# Patient Record
Sex: MALE | Employment: OTHER | URBAN - METROPOLITAN AREA
[De-identification: names, ages, dates, MRNs, and addresses within clinical notes are randomized per-mention and may not be internally consistent; named-entity substitution may affect disease eponyms.]

---

## 2024-01-21 ENCOUNTER — HOSPITAL ENCOUNTER (EMERGENCY)
Age: 24
Discharge: HOME | End: 2024-01-21
Attending: EMERGENCY MEDICINE
Payer: COMMERCIAL

## 2024-01-21 ENCOUNTER — APPOINTMENT (OUTPATIENT)
Dept: GENERAL RADIOLOGY | Age: 24
End: 2024-01-21
Payer: COMMERCIAL

## 2024-01-21 ENCOUNTER — APPOINTMENT (OUTPATIENT)
Dept: CT IMAGING | Age: 24
End: 2024-01-21
Payer: COMMERCIAL

## 2024-01-21 VITALS
SYSTOLIC BLOOD PRESSURE: 133 MMHG | DIASTOLIC BLOOD PRESSURE: 81 MMHG | WEIGHT: 180 LBS | OXYGEN SATURATION: 100 % | TEMPERATURE: 99.7 F | RESPIRATION RATE: 17 BRPM | HEART RATE: 95 BPM

## 2024-01-21 DIAGNOSIS — R55 SYNCOPE AND COLLAPSE: Primary | ICD-10-CM

## 2024-01-21 DIAGNOSIS — D64.9 ANEMIA, UNSPECIFIED TYPE: ICD-10-CM

## 2024-01-21 LAB
ALBUMIN SERPL-MCNC: 3.9 G/DL (ref 3.5–5.2)
ALP SERPL-CCNC: 48 U/L (ref 40–129)
ALT SERPL-CCNC: 31 U/L (ref 5–41)
ANION GAP SERPL CALCULATED.3IONS-SCNC: 11 MMOL/L (ref 9–17)
AST SERPL-CCNC: 16 U/L
BASOPHILS # BLD: 0.12 K/UL (ref 0–0.2)
BASOPHILS NFR BLD: 1 % (ref 0–2)
BILIRUB DIRECT SERPL-MCNC: <0.1 MG/DL
BILIRUB INDIRECT SERPL-MCNC: ABNORMAL MG/DL (ref 0–1)
BILIRUB SERPL-MCNC: 0.2 MG/DL (ref 0.3–1.2)
BUN SERPL-MCNC: 30 MG/DL (ref 6–20)
BUN/CREAT SERPL: 43 (ref 9–20)
CALCIUM SERPL-MCNC: 8.2 MG/DL (ref 8.6–10.4)
CHLORIDE SERPL-SCNC: 108 MMOL/L (ref 98–107)
CO2 SERPL-SCNC: 21 MMOL/L (ref 20–31)
CREAT SERPL-MCNC: 0.7 MG/DL (ref 0.7–1.2)
EOSINOPHIL # BLD: 0.3 K/UL (ref 0–0.44)
EOSINOPHILS RELATIVE PERCENT: 3 % (ref 1–4)
ERYTHROCYTE [DISTWIDTH] IN BLOOD BY AUTOMATED COUNT: 13.5 % (ref 11.8–14.4)
GFR SERPL CREATININE-BSD FRML MDRD: >60 ML/MIN/1.73M2
GLUCOSE SERPL-MCNC: 117 MG/DL (ref 70–99)
HCT VFR BLD AUTO: 26.5 % (ref 40.7–50.3)
HGB BLD-MCNC: 9.1 G/DL (ref 13–17)
IMM GRANULOCYTES # BLD AUTO: 0.36 K/UL (ref 0–0.3)
IMM GRANULOCYTES NFR BLD: 3 %
LYMPHOCYTES NFR BLD: 2.85 K/UL (ref 1.1–3.7)
LYMPHOCYTES RELATIVE PERCENT: 24 % (ref 24–43)
MAGNESIUM SERPL-MCNC: 1.7 MG/DL (ref 1.6–2.6)
MCH RBC QN AUTO: 28.7 PG (ref 25.2–33.5)
MCHC RBC AUTO-ENTMCNC: 34.3 G/DL (ref 28.4–34.8)
MCV RBC AUTO: 83.6 FL (ref 82.6–102.9)
MONOCYTES NFR BLD: 0.62 K/UL (ref 0.1–1.2)
MONOCYTES NFR BLD: 5 % (ref 3–12)
NEUTROPHILS NFR BLD: 64 % (ref 36–65)
NEUTS SEG NFR BLD: 7.83 K/UL (ref 1.5–8.1)
NRBC BLD-RTO: 0 PER 100 WBC
PLATELET # BLD AUTO: 252 K/UL (ref 138–453)
PMV BLD AUTO: 10.6 FL (ref 8.1–13.5)
POTASSIUM SERPL-SCNC: 4 MMOL/L (ref 3.7–5.3)
PROT SERPL-MCNC: 6 G/DL (ref 6.4–8.3)
RBC # BLD AUTO: 3.17 M/UL (ref 4.21–5.77)
SODIUM SERPL-SCNC: 140 MMOL/L (ref 135–144)
WBC OTHER # BLD: 12.1 K/UL (ref 3.5–11.3)

## 2024-01-21 PROCEDURE — 71045 X-RAY EXAM CHEST 1 VIEW: CPT

## 2024-01-21 PROCEDURE — 72125 CT NECK SPINE W/O DYE: CPT

## 2024-01-21 PROCEDURE — 93005 ELECTROCARDIOGRAM TRACING: CPT | Performed by: EMERGENCY MEDICINE

## 2024-01-21 PROCEDURE — 96360 HYDRATION IV INFUSION INIT: CPT

## 2024-01-21 PROCEDURE — 80076 HEPATIC FUNCTION PANEL: CPT

## 2024-01-21 PROCEDURE — 96361 HYDRATE IV INFUSION ADD-ON: CPT

## 2024-01-21 PROCEDURE — 99285 EMERGENCY DEPT VISIT HI MDM: CPT

## 2024-01-21 PROCEDURE — 80048 BASIC METABOLIC PNL TOTAL CA: CPT

## 2024-01-21 PROCEDURE — 85025 COMPLETE CBC W/AUTO DIFF WBC: CPT

## 2024-01-21 PROCEDURE — 83735 ASSAY OF MAGNESIUM: CPT

## 2024-01-21 PROCEDURE — 2580000003 HC RX 258: Performed by: NURSE PRACTITIONER

## 2024-01-21 PROCEDURE — 70450 CT HEAD/BRAIN W/O DYE: CPT

## 2024-01-21 RX ORDER — 0.9 % SODIUM CHLORIDE 0.9 %
1000 INTRAVENOUS SOLUTION INTRAVENOUS ONCE
Status: COMPLETED | OUTPATIENT
Start: 2024-01-21 | End: 2024-01-21

## 2024-01-21 RX ORDER — PNV NO.95/FERROUS FUM/FOLIC AC 28MG-0.8MG
1 TABLET ORAL DAILY
Qty: 14 TABLET | Refills: 0 | Status: SHIPPED | OUTPATIENT
Start: 2024-01-21

## 2024-01-21 RX ADMIN — SODIUM CHLORIDE 1000 ML: 9 INJECTION, SOLUTION INTRAVENOUS at 19:35

## 2024-01-21 RX ADMIN — SODIUM CHLORIDE 1000 ML: 9 INJECTION, SOLUTION INTRAVENOUS at 17:34

## 2024-01-21 ASSESSMENT — PAIN - FUNCTIONAL ASSESSMENT: PAIN_FUNCTIONAL_ASSESSMENT: 0-10

## 2024-01-21 ASSESSMENT — PAIN SCALES - GENERAL: PAINLEVEL_OUTOF10: 6

## 2024-01-21 NOTE — ED NOTES
Pt presents to ED via EMS. Pt had 2 witnessed syncopal episodes in the last 24 hours. Mother stated last night pt fell in bathtub. Pt does not remember what happened. Mother stated pt did his his head and lost consciousness. Pt is not on any blood thinners. Pt does not take any medications. Pt has autism and Asperger.

## 2024-01-22 LAB
EKG ATRIAL RATE: 109 BPM
EKG P AXIS: 52 DEGREES
EKG P-R INTERVAL: 130 MS
EKG Q-T INTERVAL: 324 MS
EKG QRS DURATION: 82 MS
EKG QTC CALCULATION (BAZETT): 436 MS
EKG R AXIS: 38 DEGREES
EKG T AXIS: 65 DEGREES
EKG VENTRICULAR RATE: 109 BPM

## 2024-01-22 NOTE — ED PROVIDER NOTES
eMERGENCY dEPARTMENT eNCOUnter   Independent Attestation     Pt Name: Nicholas Martinez  MRN: [id number]  Birthdate 2000  Date of evaluation: 1/21/24     Nicholas Martinez is a 23 y.o. male with CC: Loss of Consciousness (2 syncopal episodes in last 24 hours. Pt did hit his head last night. )        This visit was performed by both a physician and an APC. I performed all aspects of the MDM as documented.      Erica B Goldberger, MD  Attending Emergency Physician       Sinus tachycardia ventricular rate 109  Nonspecific T wave flattening    QTc 436  Nonspecific EKG     Goldberger, Erica B, MD  01/21/24 2038    
for exam:     Answer:   Syncope, dizziness, injury     Order Specific Question:   Decision Support Exception - unselect if not a suspected or confirmed emergency medical condition     Answer:   Emergency Medical Condition (MA) [1]    CT CERVICAL SPINE WO CONTRAST     Standing Status:   Standing     Number of Occurrences:   1     Order Specific Question:   Reason for exam:     Answer:   fall     Order Specific Question:   Decision Support Exception - unselect if not a suspected or confirmed emergency medical condition     Answer:   Emergency Medical Condition (MA) [1]    BMP     Standing Status:   Standing     Number of Occurrences:   1    CBC with Auto Differential     Standing Status:   Standing     Number of Occurrences:   1    Magnesium     Standing Status:   Standing     Number of Occurrences:   1    Hepatic Function Panel     Standing Status:   Standing     Number of Occurrences:   1    Cardiac Monitor - ED Only     Standing Status:   Standing     Number of Occurrences:   1     Order Specific Question:   Patient may go off unit without monitor     Answer:   No    Orthostatic blood pressure and pulse     Standing Status:   Standing     Number of Occurrences:   1    EKG 12 Lead     Standing Status:   Standing     Number of Occurrences:   1     Order Specific Question:   Reason for Exam?     Answer:   Altered mental status    Saline lock IV     Standing Status:   Standing     Number of Occurrences:   1         CLINICAL DECISION MAKING:  The patient presented alert with a nontoxic appearance and was seen in conjunction with Dr. Goldberger. The patient was discussed with the ED attending. Orthostatics were completed.     The patient and his mother were involved in his plan of care through shared decision making. The testing that was ordered was discussed with the patient. Any medications that may have been ordered were discussed with the patient.     I have reviewed the patient's previous medical records using the

## 2024-01-23 ENCOUNTER — HOSPITAL ENCOUNTER (INPATIENT)
Age: 24
LOS: 2 days | Discharge: HOME | End: 2024-01-25
Attending: EMERGENCY MEDICINE | Admitting: INTERNAL MEDICINE
Payer: COMMERCIAL

## 2024-01-23 ENCOUNTER — ANESTHESIA (OUTPATIENT)
Dept: OPERATING ROOM | Age: 24
End: 2024-01-23
Payer: COMMERCIAL

## 2024-01-23 ENCOUNTER — ANESTHESIA EVENT (OUTPATIENT)
Dept: OPERATING ROOM | Age: 24
End: 2024-01-23
Payer: COMMERCIAL

## 2024-01-23 DIAGNOSIS — D64.9 ANEMIA, UNSPECIFIED TYPE: ICD-10-CM

## 2024-01-23 DIAGNOSIS — K92.1 GASTROINTESTINAL HEMORRHAGE WITH MELENA: Primary | ICD-10-CM

## 2024-01-23 DIAGNOSIS — K92.2 UPPER GI BLEED: ICD-10-CM

## 2024-01-23 LAB
ALBUMIN SERPL-MCNC: 3.5 G/DL (ref 3.5–5.2)
ALP SERPL-CCNC: 40 U/L (ref 40–129)
ALT SERPL-CCNC: 19 U/L (ref 5–41)
ANION GAP SERPL CALCULATED.3IONS-SCNC: 12 MMOL/L (ref 9–17)
AST SERPL-CCNC: 15 U/L
BACTERIA URNS QL MICRO: ABNORMAL
BASOPHILS # BLD: 0.14 K/UL (ref 0–0.2)
BASOPHILS NFR BLD: 1 % (ref 0–2)
BILIRUB SERPL-MCNC: 0.1 MG/DL (ref 0.3–1.2)
BILIRUB UR QL STRIP: NEGATIVE
BUN SERPL-MCNC: 20 MG/DL (ref 6–20)
BUN/CREAT SERPL: 25 (ref 9–20)
CALCIUM SERPL-MCNC: 8 MG/DL (ref 8.6–10.4)
CHLORIDE SERPL-SCNC: 105 MMOL/L (ref 98–107)
CLARITY UR: CLEAR
CO2 SERPL-SCNC: 22 MMOL/L (ref 20–31)
COLOR UR: YELLOW
CREAT SERPL-MCNC: 0.8 MG/DL (ref 0.7–1.2)
DATE, STOOL #1: 1
EKG ATRIAL RATE: 103 BPM
EKG P AXIS: 42 DEGREES
EKG P-R INTERVAL: 124 MS
EKG Q-T INTERVAL: 360 MS
EKG QRS DURATION: 88 MS
EKG QTC CALCULATION (BAZETT): 471 MS
EKG R AXIS: 35 DEGREES
EKG T AXIS: 21 DEGREES
EKG VENTRICULAR RATE: 103 BPM
EOSINOPHIL # BLD: 0.27 K/UL (ref 0–0.44)
EOSINOPHILS RELATIVE PERCENT: 2 % (ref 1–4)
EPI CELLS #/AREA URNS HPF: ABNORMAL /HPF (ref 0–5)
ERYTHROCYTE [DISTWIDTH] IN BLOOD BY AUTOMATED COUNT: 15.7 % (ref 11.8–14.4)
GFR SERPL CREATININE-BSD FRML MDRD: >60 ML/MIN/1.73M2
GLUCOSE BLD-MCNC: 112 MG/DL (ref 75–110)
GLUCOSE SERPL-MCNC: 181 MG/DL (ref 70–99)
GLUCOSE UR STRIP-MCNC: NEGATIVE MG/DL
HCT VFR BLD AUTO: 17 % (ref 40.7–50.3)
HCT VFR BLD AUTO: 21.2 % (ref 40.7–50.3)
HCT VFR BLD AUTO: 23.7 % (ref 40.7–50.3)
HEMOCCULT SP1 STL QL: POSITIVE
HGB BLD-MCNC: 5.6 G/DL (ref 13–17)
HGB BLD-MCNC: 7 G/DL (ref 13–17)
HGB BLD-MCNC: 7.7 G/DL (ref 13–17)
HGB UR QL STRIP.AUTO: ABNORMAL
IMM GRANULOCYTES # BLD AUTO: 1.5 K/UL (ref 0–0.3)
IMM GRANULOCYTES NFR BLD: 11 %
INR PPP: 1.2
KETONES UR STRIP-MCNC: ABNORMAL MG/DL
LEUKOCYTE ESTERASE UR QL STRIP: NEGATIVE
LYMPHOCYTES NFR BLD: 2.58 K/UL (ref 1.1–3.7)
LYMPHOCYTES RELATIVE PERCENT: 19 % (ref 24–43)
MAGNESIUM SERPL-MCNC: 1.6 MG/DL (ref 1.6–2.6)
MAGNESIUM SERPL-MCNC: 1.7 MG/DL (ref 1.6–2.6)
MCH RBC QN AUTO: 28.7 PG (ref 25.2–33.5)
MCHC RBC AUTO-ENTMCNC: 32.9 G/DL (ref 28.4–34.8)
MCV RBC AUTO: 87.2 FL (ref 82.6–102.9)
MONOCYTES NFR BLD: 0.82 K/UL (ref 0.1–1.2)
MONOCYTES NFR BLD: 6 % (ref 3–12)
MORPHOLOGY: ABNORMAL
MUCOUS THREADS URNS QL MICRO: ABNORMAL
NEUTROPHILS NFR BLD: 61 % (ref 36–65)
NEUTS SEG NFR BLD: 8.29 K/UL (ref 1.5–8.1)
NITRITE UR QL STRIP: NEGATIVE
NRBC BLD-RTO: 1.8 PER 100 WBC
PH UR STRIP: 6 [PH] (ref 5–8)
PLATELET # BLD AUTO: 254 K/UL (ref 138–453)
PMV BLD AUTO: 10.4 FL (ref 8.1–13.5)
POTASSIUM SERPL-SCNC: 3.3 MMOL/L (ref 3.7–5.3)
POTASSIUM SERPL-SCNC: 3.7 MMOL/L (ref 3.7–5.3)
PROT SERPL-MCNC: 5.5 G/DL (ref 6.4–8.3)
PROT UR STRIP-MCNC: NEGATIVE MG/DL
PROTHROMBIN TIME: 14.9 SEC (ref 11.5–14.2)
RBC # BLD AUTO: 1.95 M/UL (ref 4.21–5.77)
RBC #/AREA URNS HPF: ABNORMAL /HPF (ref 0–2)
SODIUM SERPL-SCNC: 139 MMOL/L (ref 135–144)
SP GR UR STRIP: 1.02 (ref 1–1.03)
TIME, STOOL #1: 1042
UROBILINOGEN UR STRIP-ACNC: NORMAL EU/DL (ref 0–1)
WBC #/AREA URNS HPF: ABNORMAL /HPF (ref 0–5)
WBC OTHER # BLD: 13.6 K/UL (ref 3.5–11.3)

## 2024-01-23 PROCEDURE — 3609012400 HC EGD TRANSORAL BIOPSY SINGLE/MULTIPLE: Performed by: INTERNAL MEDICINE

## 2024-01-23 PROCEDURE — 6360000002 HC RX W HCPCS: Performed by: EMERGENCY MEDICINE

## 2024-01-23 PROCEDURE — 99285 EMERGENCY DEPT VISIT HI MDM: CPT

## 2024-01-23 PROCEDURE — 86850 RBC ANTIBODY SCREEN: CPT

## 2024-01-23 PROCEDURE — 2580000003 HC RX 258: Performed by: NURSE PRACTITIONER

## 2024-01-23 PROCEDURE — 85014 HEMATOCRIT: CPT

## 2024-01-23 PROCEDURE — 93005 ELECTROCARDIOGRAM TRACING: CPT | Performed by: NURSE PRACTITIONER

## 2024-01-23 PROCEDURE — 85610 PROTHROMBIN TIME: CPT

## 2024-01-23 PROCEDURE — 6360000002 HC RX W HCPCS: Performed by: NURSE ANESTHETIST, CERTIFIED REGISTERED

## 2024-01-23 PROCEDURE — 83735 ASSAY OF MAGNESIUM: CPT

## 2024-01-23 PROCEDURE — 6370000000 HC RX 637 (ALT 250 FOR IP): Performed by: NURSE PRACTITIONER

## 2024-01-23 PROCEDURE — 3700000001 HC ADD 15 MINUTES (ANESTHESIA): Performed by: INTERNAL MEDICINE

## 2024-01-23 PROCEDURE — 2709999900 HC NON-CHARGEABLE SUPPLY: Performed by: INTERNAL MEDICINE

## 2024-01-23 PROCEDURE — 6360000002 HC RX W HCPCS: Performed by: INTERNAL MEDICINE

## 2024-01-23 PROCEDURE — 2720000010 HC SURG SUPPLY STERILE: Performed by: INTERNAL MEDICINE

## 2024-01-23 PROCEDURE — 30233N1 TRANSFUSION OF NONAUTOLOGOUS RED BLOOD CELLS INTO PERIPHERAL VEIN, PERCUTANEOUS APPROACH: ICD-10-PCS | Performed by: INTERNAL MEDICINE

## 2024-01-23 PROCEDURE — 6360000002 HC RX W HCPCS: Performed by: NURSE PRACTITIONER

## 2024-01-23 PROCEDURE — C9113 INJ PANTOPRAZOLE SODIUM, VIA: HCPCS | Performed by: INTERNAL MEDICINE

## 2024-01-23 PROCEDURE — P9016 RBC LEUKOCYTES REDUCED: HCPCS

## 2024-01-23 PROCEDURE — 2580000003 HC RX 258: Performed by: EMERGENCY MEDICINE

## 2024-01-23 PROCEDURE — 86920 COMPATIBILITY TEST SPIN: CPT

## 2024-01-23 PROCEDURE — 85018 HEMOGLOBIN: CPT

## 2024-01-23 PROCEDURE — 99222 1ST HOSP IP/OBS MODERATE 55: CPT | Performed by: NURSE PRACTITIONER

## 2024-01-23 PROCEDURE — 36430 TRANSFUSION BLD/BLD COMPNT: CPT

## 2024-01-23 PROCEDURE — 86901 BLOOD TYPING SEROLOGIC RH(D): CPT

## 2024-01-23 PROCEDURE — 96361 HYDRATE IV INFUSION ADD-ON: CPT

## 2024-01-23 PROCEDURE — 2060000000 HC ICU INTERMEDIATE R&B

## 2024-01-23 PROCEDURE — 82270 OCCULT BLOOD FECES: CPT

## 2024-01-23 PROCEDURE — 96374 THER/PROPH/DIAG INJ IV PUSH: CPT

## 2024-01-23 PROCEDURE — 88305 TISSUE EXAM BY PATHOLOGIST: CPT

## 2024-01-23 PROCEDURE — 83540 ASSAY OF IRON: CPT

## 2024-01-23 PROCEDURE — 85025 COMPLETE CBC W/AUTO DIFF WBC: CPT

## 2024-01-23 PROCEDURE — 7100000000 HC PACU RECOVERY - FIRST 15 MIN: Performed by: INTERNAL MEDICINE

## 2024-01-23 PROCEDURE — 0DB58ZX EXCISION OF ESOPHAGUS, VIA NATURAL OR ARTIFICIAL OPENING ENDOSCOPIC, DIAGNOSTIC: ICD-10-PCS | Performed by: INTERNAL MEDICINE

## 2024-01-23 PROCEDURE — 2500000003 HC RX 250 WO HCPCS: Performed by: NURSE ANESTHETIST, CERTIFIED REGISTERED

## 2024-01-23 PROCEDURE — C1889 IMPLANT/INSERT DEVICE, NOC: HCPCS | Performed by: INTERNAL MEDICINE

## 2024-01-23 PROCEDURE — C9113 INJ PANTOPRAZOLE SODIUM, VIA: HCPCS | Performed by: EMERGENCY MEDICINE

## 2024-01-23 PROCEDURE — 3700000000 HC ANESTHESIA ATTENDED CARE: Performed by: INTERNAL MEDICINE

## 2024-01-23 PROCEDURE — 0W3P8ZZ CONTROL BLEEDING IN GASTROINTESTINAL TRACT, VIA NATURAL OR ARTIFICIAL OPENING ENDOSCOPIC: ICD-10-PCS | Performed by: INTERNAL MEDICINE

## 2024-01-23 PROCEDURE — 0DB68ZX EXCISION OF STOMACH, VIA NATURAL OR ARTIFICIAL OPENING ENDOSCOPIC, DIAGNOSTIC: ICD-10-PCS | Performed by: INTERNAL MEDICINE

## 2024-01-23 PROCEDURE — 82947 ASSAY GLUCOSE BLOOD QUANT: CPT

## 2024-01-23 PROCEDURE — 7100000001 HC PACU RECOVERY - ADDTL 15 MIN: Performed by: INTERNAL MEDICINE

## 2024-01-23 PROCEDURE — 86900 BLOOD TYPING SEROLOGIC ABO: CPT

## 2024-01-23 PROCEDURE — 81001 URINALYSIS AUTO W/SCOPE: CPT

## 2024-01-23 PROCEDURE — 36415 COLL VENOUS BLD VENIPUNCTURE: CPT

## 2024-01-23 PROCEDURE — 94761 N-INVAS EAR/PLS OXIMETRY MLT: CPT

## 2024-01-23 PROCEDURE — 2580000003 HC RX 258: Performed by: INTERNAL MEDICINE

## 2024-01-23 PROCEDURE — 84132 ASSAY OF SERUM POTASSIUM: CPT

## 2024-01-23 PROCEDURE — 83550 IRON BINDING TEST: CPT

## 2024-01-23 PROCEDURE — 80053 COMPREHEN METABOLIC PANEL: CPT

## 2024-01-23 PROCEDURE — 93005 ELECTROCARDIOGRAM TRACING: CPT | Performed by: INTERNAL MEDICINE

## 2024-01-23 DEVICE — WORKING LENGTH 235CM, WORKING CHANNEL 2.8MM
Type: IMPLANTABLE DEVICE | Site: ESOPHAGUS | Status: FUNCTIONAL
Brand: RESOLUTION 360 CLIP

## 2024-01-23 RX ORDER — SODIUM CHLORIDE 9 MG/ML
INJECTION, SOLUTION INTRAVENOUS PRN
Status: DISCONTINUED | OUTPATIENT
Start: 2024-01-23 | End: 2024-01-25 | Stop reason: HOSPADM

## 2024-01-23 RX ORDER — EPINEPHRINE 1 MG/ML(1)
AMPUL (ML) INJECTION PRN
Status: DISCONTINUED | OUTPATIENT
Start: 2024-01-23 | End: 2024-01-23 | Stop reason: ALTCHOICE

## 2024-01-23 RX ORDER — POTASSIUM CHLORIDE 7.45 MG/ML
10 INJECTION INTRAVENOUS PRN
Status: DISCONTINUED | OUTPATIENT
Start: 2024-01-23 | End: 2024-01-25 | Stop reason: HOSPADM

## 2024-01-23 RX ORDER — POLYETHYLENE GLYCOL 3350 17 G/17G
17 POWDER, FOR SOLUTION ORAL DAILY PRN
Status: DISCONTINUED | OUTPATIENT
Start: 2024-01-23 | End: 2024-01-25 | Stop reason: HOSPADM

## 2024-01-23 RX ORDER — ACETAMINOPHEN 325 MG/1
650 TABLET ORAL EVERY 6 HOURS PRN
Status: DISCONTINUED | OUTPATIENT
Start: 2024-01-23 | End: 2024-01-25 | Stop reason: HOSPADM

## 2024-01-23 RX ORDER — ONDANSETRON 2 MG/ML
4 INJECTION INTRAMUSCULAR; INTRAVENOUS ONCE
Status: COMPLETED | OUTPATIENT
Start: 2024-01-23 | End: 2024-01-23

## 2024-01-23 RX ORDER — LIDOCAINE HYDROCHLORIDE 20 MG/ML
INJECTION, SOLUTION EPIDURAL; INFILTRATION; INTRACAUDAL; PERINEURAL PRN
Status: DISCONTINUED | OUTPATIENT
Start: 2024-01-23 | End: 2024-01-23 | Stop reason: SDUPTHER

## 2024-01-23 RX ORDER — SODIUM CHLORIDE 9 MG/ML
INJECTION, SOLUTION INTRAVENOUS CONTINUOUS
Status: DISCONTINUED | OUTPATIENT
Start: 2024-01-23 | End: 2024-01-25 | Stop reason: HOSPADM

## 2024-01-23 RX ORDER — MAGNESIUM SULFATE 1 G/100ML
1000 INJECTION INTRAVENOUS PRN
Status: DISCONTINUED | OUTPATIENT
Start: 2024-01-23 | End: 2024-01-25 | Stop reason: HOSPADM

## 2024-01-23 RX ORDER — ONDANSETRON 2 MG/ML
4 INJECTION INTRAMUSCULAR; INTRAVENOUS EVERY 6 HOURS PRN
Status: DISCONTINUED | OUTPATIENT
Start: 2024-01-23 | End: 2024-01-25 | Stop reason: HOSPADM

## 2024-01-23 RX ORDER — 0.9 % SODIUM CHLORIDE 0.9 %
500 INTRAVENOUS SOLUTION INTRAVENOUS ONCE
Status: COMPLETED | OUTPATIENT
Start: 2024-01-23 | End: 2024-01-23

## 2024-01-23 RX ORDER — SODIUM CHLORIDE 0.9 % (FLUSH) 0.9 %
10 SYRINGE (ML) INJECTION PRN
Status: DISCONTINUED | OUTPATIENT
Start: 2024-01-23 | End: 2024-01-25 | Stop reason: HOSPADM

## 2024-01-23 RX ORDER — MAGNESIUM SULFATE 1 G/100ML
1000 INJECTION INTRAVENOUS ONCE
Status: COMPLETED | OUTPATIENT
Start: 2024-01-23 | End: 2024-01-24

## 2024-01-23 RX ORDER — SODIUM CHLORIDE 0.9 % (FLUSH) 0.9 %
5-40 SYRINGE (ML) INJECTION EVERY 12 HOURS SCHEDULED
Status: DISCONTINUED | OUTPATIENT
Start: 2024-01-23 | End: 2024-01-25 | Stop reason: HOSPADM

## 2024-01-23 RX ORDER — POTASSIUM CHLORIDE 20 MEQ/1
40 TABLET, EXTENDED RELEASE ORAL PRN
Status: DISCONTINUED | OUTPATIENT
Start: 2024-01-23 | End: 2024-01-25 | Stop reason: HOSPADM

## 2024-01-23 RX ORDER — ACETAMINOPHEN 650 MG/1
650 SUPPOSITORY RECTAL EVERY 6 HOURS PRN
Status: DISCONTINUED | OUTPATIENT
Start: 2024-01-23 | End: 2024-01-25 | Stop reason: HOSPADM

## 2024-01-23 RX ORDER — PROPOFOL 10 MG/ML
INJECTION, EMULSION INTRAVENOUS PRN
Status: DISCONTINUED | OUTPATIENT
Start: 2024-01-23 | End: 2024-01-23 | Stop reason: SDUPTHER

## 2024-01-23 RX ORDER — ONDANSETRON 4 MG/1
4 TABLET, ORALLY DISINTEGRATING ORAL EVERY 8 HOURS PRN
Status: DISCONTINUED | OUTPATIENT
Start: 2024-01-23 | End: 2024-01-25 | Stop reason: HOSPADM

## 2024-01-23 RX ORDER — 0.9 % SODIUM CHLORIDE 0.9 %
1000 INTRAVENOUS SOLUTION INTRAVENOUS ONCE
Status: COMPLETED | OUTPATIENT
Start: 2024-01-23 | End: 2024-01-23

## 2024-01-23 RX ORDER — 0.9 % SODIUM CHLORIDE 0.9 %
1000 INTRAVENOUS SOLUTION INTRAVENOUS ONCE
Status: DISCONTINUED | OUTPATIENT
Start: 2024-01-23 | End: 2024-01-25 | Stop reason: HOSPADM

## 2024-01-23 RX ADMIN — SODIUM CHLORIDE, PRESERVATIVE FREE 80 MG: 5 INJECTION INTRAVENOUS at 12:03

## 2024-01-23 RX ADMIN — PANTOPRAZOLE SODIUM 8 MG/HR: 40 INJECTION, POWDER, FOR SOLUTION INTRAVENOUS at 16:28

## 2024-01-23 RX ADMIN — SODIUM CHLORIDE 1000 ML: 9 INJECTION, SOLUTION INTRAVENOUS at 11:01

## 2024-01-23 RX ADMIN — PROPOFOL 70 MG: 10 INJECTION, EMULSION INTRAVENOUS at 13:27

## 2024-01-23 RX ADMIN — ONDANSETRON 4 MG: 2 INJECTION INTRAMUSCULAR; INTRAVENOUS at 11:02

## 2024-01-23 RX ADMIN — MAGNESIUM SULFATE HEPTAHYDRATE 1000 MG: 1 INJECTION, SOLUTION INTRAVENOUS at 23:31

## 2024-01-23 RX ADMIN — LIDOCAINE HYDROCHLORIDE 60 MG: 20 INJECTION, SOLUTION EPIDURAL; INFILTRATION; INTRACAUDAL at 13:27

## 2024-01-23 RX ADMIN — PROPOFOL 50 MG: 10 INJECTION, EMULSION INTRAVENOUS at 13:30

## 2024-01-23 RX ADMIN — PROPOFOL 50 MG: 10 INJECTION, EMULSION INTRAVENOUS at 13:37

## 2024-01-23 RX ADMIN — PROPOFOL 50 MG: 10 INJECTION, EMULSION INTRAVENOUS at 13:42

## 2024-01-23 RX ADMIN — PROPOFOL 30 MG: 10 INJECTION, EMULSION INTRAVENOUS at 13:33

## 2024-01-23 RX ADMIN — POTASSIUM CHLORIDE 40 MEQ: 1500 TABLET, EXTENDED RELEASE ORAL at 22:34

## 2024-01-23 RX ADMIN — SODIUM CHLORIDE: 9 INJECTION, SOLUTION INTRAVENOUS at 16:28

## 2024-01-23 RX ADMIN — SODIUM CHLORIDE, PRESERVATIVE FREE 10 ML: 5 INJECTION INTRAVENOUS at 20:09

## 2024-01-23 RX ADMIN — SODIUM CHLORIDE 500 ML: 9 INJECTION, SOLUTION INTRAVENOUS at 20:45

## 2024-01-23 ASSESSMENT — ENCOUNTER SYMPTOMS
PHOTOPHOBIA: 0
SINUS PAIN: 0
WHEEZING: 0
CONSTIPATION: 1
DIARRHEA: 1
NAUSEA: 0
SINUS PRESSURE: 0
COUGH: 0
VOMITING: 0
SHORTNESS OF BREATH: 0
ABDOMINAL PAIN: 0

## 2024-01-23 ASSESSMENT — PAIN SCALES - GENERAL
PAINLEVEL_OUTOF10: 7
PAINLEVEL_OUTOF10: 0

## 2024-01-23 ASSESSMENT — PAIN - FUNCTIONAL ASSESSMENT
PAIN_FUNCTIONAL_ASSESSMENT: 0-10
PAIN_FUNCTIONAL_ASSESSMENT: FACE, LEGS, ACTIVITY, CRY, AND CONSOLABILITY (FLACC)

## 2024-01-23 ASSESSMENT — PAIN DESCRIPTION - LOCATION: LOCATION: BACK;HEAD

## 2024-01-23 ASSESSMENT — PAIN DESCRIPTION - DESCRIPTORS: DESCRIPTORS: ACHING

## 2024-01-23 ASSESSMENT — PAIN DESCRIPTION - FREQUENCY: FREQUENCY: CONTINUOUS

## 2024-01-23 NOTE — ANESTHESIA PRE PROCEDURE
Department of Anesthesiology  Preprocedure Note       Name:  Nicholas Martinez   Age:  23 y.o.  :  2000                                          MRN:  [id number]         Date:  2024      Surgeon: Surgeon(s):  Josiah Del Toro MD    Procedure: Procedure(s):  EGD BIOPSY    Medications prior to admission:   Prior to Admission medications    Medication Sig Start Date End Date Taking? Authorizing Provider   Ferrous Sulfate (IRON) 325 (65 Fe) MG TABS Take 1 tablet by mouth daily 24   Kamila Holt, APRN - CNP       Current medications:    Current Facility-Administered Medications   Medication Dose Route Frequency Provider Last Rate Last Admin    [MAR Hold] 0.9 % sodium chloride infusion   IntraVENous PRN Ten Donato MD        [MAR Hold] sodium chloride 0.9 % bolus 1,000 mL  1,000 mL IntraVENous Once Ten Donato MD        [MAR Hold] 0.9 % sodium chloride infusion   IntraVENous PRN Zhao Remy DO           Allergies:  No Known Allergies    Problem List:    Patient Active Problem List   Diagnosis Code    Acute GI bleeding K92.2       Past Medical History:        Diagnosis Date    Anemia     Asperger syndrome     Autism        Past Surgical History:  History reviewed. No pertinent surgical history.    Social History:    Social History     Tobacco Use    Smoking status: Never    Smokeless tobacco: Never   Substance Use Topics    Alcohol use: Never                                Counseling given: Not Answered      Vital Signs (Current):   Vitals:    24 1231 24 1236 24 1245 24 1352   BP: 131/71 138/73 138/67 138/74   Pulse: (!) 110 (!) 114 (!) 118 95   Resp: 11 12 16 20   Temp:    97.9 °F (36.6 °C)   TempSrc:    Temporal   SpO2: 100% 99% 100% 100%                                              BP Readings from Last 3 Encounters:   24 138/74   24 133/81       NPO Status:                                                                                 BMI:   Wt

## 2024-01-23 NOTE — CONSULTS
GASTROENTEROLOGY CONSULT       REASON FOR CONSULT:  GI Bleed     REQUESTING PHYSICIAN:  Colton Yepez DO    HISTORY OF PRESENT ILLNESS:    The patient is a 23 y.o. male who presented to the ED today with dizziness. He was seen 01/21 with similar symptoms and reports of syncope. Hemoglobin at that time was 9.1. He was discharged from the ED with recommendations for iron supplements. He continued to not feel well prompting him to come back to the ED today. Repeat hemoglobin today is 5.6. Patient reports episodes of melena at home prior to starting iron supplements. He denies history of GI bleed but admits to taking numerous Ibuprofen daily for chronic back pain. Presently, he is lying in bed in no acute distress. He has a history of autism/aspergers but is able to communicate well and appropriately. Denies abdominal pain nausea or vomiting. He saw black stool for the first time yesterday.Denies seeing bright red blood in stools. Prior to 01/21 he reports feeling well. His appetite has been good. Weight is stable.     MEDICAL HISTORY:   Past Medical History:   Diagnosis Date    Anemia     Asperger syndrome     Autism        SURGICAL HISTORY:  History reviewed. No pertinent surgical history.    MEDS:  Prior to Admission medications    Medication Sig Start Date End Date Taking? Authorizing Provider   Ferrous Sulfate (IRON) 325 (65 Fe) MG TABS Take 1 tablet by mouth daily 1/21/24   Kamila Holt, APRN - CNP     Scheduled Meds:  Continuous Infusions:   sodium chloride       PRN Meds:sodium chloride    ALLERGIES:  No Known Allergies    SOCIAL HISTORY:    reports that he has never smoked. He has never used smokeless tobacco. He reports that he does not drink alcohol and does not use drugs.    FAMILY HISTORY:   History reviewed. No pertinent family history.    REVIEW OF SYSTEMS:  10 out of 14 systems otherwise negative according to patient.    PHYSICAL EXAM:    /61   Pulse (!) 117   Temp 99.3 °F (37.4  °C) (Oral)   Resp 16   SpO2 100%     General:  Alert and oriented x 3.  No acute distress.       HEENT:  Oropharynx moist and pink without thrush, neck supple without masses.    Chest: Lungs clear to auscultation, with no rales, wheezes, or rhonchi. Unlabored breathing pattern.    Cardiovascular: Heart sounds were normal with a regular rate and rhythm.  There were no murmurs or gallops, PMI nondisplaced.     Abdomen:  Bowel sounds were normal.  The abdomen was soft and non distended.  There was no tenderness, guarding, rebound, or rigidity.  There was no masses, hepatosplenomegaly, or hernias.  No peritoneal signs.    Skin/Musculoskeltal/Ext:  pale      Lab and Imaging Review   Recent Labs     01/21/24  1725 01/23/24  1017 01/23/24  1103   WBC 12.1*  --  13.6*   HGB 9.1*  --  5.6*   MCV 83.6  --  87.2     --  254   INR  --  1.2  --     139  --    K 4.0 3.7  --    * 105  --    CO2 21 22  --    BUN 30* 20  --    CREATININE 0.7 0.8  --    GLUCOSE 117* 181*  --    CALCIUM 8.2* 8.0*  --    PROT 6.0* 5.5*  --    LABALBU 3.9 3.5  --    AST 16 15  --    ALT 31 19  --    ALKPHOS 48 40  --    BILITOT 0.2* 0.1*  --    BILIDIR <0.1  --   --    MG 1.7 1.6  --      Recent Labs     01/23/24  1017   INR 1.2   PROTIME 14.9*           IMPRESSION:  Anemia  Melena    25-year-old male with acute blood loss anemia and melena. Patient has no prior history of GI bleed. He has chronic back and reports taking large quantities of NSAIDs daily. He is not on blood thinners and has not eaten since yesterday evening. He is pale and fatigued but resting comfortably and in no acute distress. He will require urgent endoscopy today for suspected active GI bleed likely second to PUD. Additional recommendations will be made as needed pending endoscopic evaluation      RECOMMENDATIONS:   EGD Today  Continue Protonix Drip  Keep NPO     This consultation was provided in collaboration with Dr. Del Toro     Electronically signed by Farrah

## 2024-01-23 NOTE — H&P
[facility]  Office: [phone]  Sunday Bello DO, Miguel A Quinn DO, Rodolfo Parr DO, Colton Yepez DO, Earl Hu MD, Aye Saavedra MD, Silverio Oshea MD, Leydi Barnett MD,  Albino Landis MD, Maribell Emery MD, Ankit Corral MD,  Jessica Peoples DO, Patria Echevarria MD, Renzo Nobles MD, Js Bello DO, Joselyn Richards MD,  Perfecto Younger DO, Maya Sorensen MD, Sierra Peck MD, Jazz Burris MD, Karthikeyan Guardado MD,  Bubba Ferrera MD, Mary Chapa MD, Jossie Landers MD, Sal Celestin MD, Gonzales Bravo MD, Kaykay Barakat MD, Roberto Tobin DO, Louie Polanco DO, Linda Sarkar MD,  Malik Jeffery MD, Shirley Waterhouse, CNP,  Mami Guillen CNP, Mike Collins, CNP,  Monae Anderson, PIERCE, Belia Au, CNP, Tanya Wilson, CNP, Pamela Fulton CNP, Ml Lee, CNP, Carol Saab, CNP, Hanane Wild, PA-C, Dora Arauz PA-C, Taryn Valenzuela, CNP, Shaye Verde, CNS, Nancy Lagos, CNP, Iman Phoenix, CNP, Tracy Schwab, CNP         [facility]   IN-PATIENT SERVICE   [facility]    HISTORY AND PHYSICAL EXAMINATION            Date:   1/23/2024  Patient name:  Nicholas Martinez  Date of admission:  1/23/2024  9:57 AM  MRN:   [id number]  Account:  [id number]  YOB: 2000  PCP:    No primary care provider on file.  Room:   [facility]/1101-01  Code Status:    No Order    Chief Complaint:     Chief Complaint   Patient presents with    Dizziness     Seen on 1/21 for same    Sweats       History Obtained From:     patient    History of Present Illness:     Nicholas Martinez is a 23 y.o. Non- / non  male who presents with Dizziness (Seen on 1/21 for same) and Sweats   and is admitted to the hospital for the management of Acute GI bleeding.    Patient was seen 48 hours ago for near syncope.  Was found to be mildly anemic and discharged home with recommendations for outpatient follow-up.  Today the patient was near syncope again but this  Expiration Date [id number]     Transfusion Status OK TO TRANSFUSE     Crossmatch Result COMPATIBLE     Unit Number [id number]     Component Leukocyte Reduced Red Cell     Unit Divison 00     Dispense Status Blood Bank ALLOCATED     Transfusion Status OK TO TRANSFUSE     Crossmatch Result COMPATIBLE    Blood Occult Stool Screen #1    Collection Time: 01/23/24 10:42 AM   Result Value Ref Range    Occult Blood, Stool #1 POSITIVE (A) NEGATIVE    Date, Stool #1 1     Time, Stool #1 1,042    CBC with Auto Differential    Collection Time: 01/23/24 11:03 AM   Result Value Ref Range    WBC 13.6 (H) 3.5 - 11.3 k/uL    RBC 1.95 (L) 4.21 - 5.77 m/uL    Hemoglobin 5.6 (LL) 13.0 - 17.0 g/dL    Hematocrit 17.0 (L) 40.7 - 50.3 %    MCV 87.2 82.6 - 102.9 fL    MCH 28.7 25.2 - 33.5 pg    MCHC 32.9 28.4 - 34.8 g/dL    RDW 15.7 (H) 11.8 - 14.4 %    Platelets 254 138 - 453 k/uL    MPV 10.4 8.1 - 13.5 fL    NRBC Automated 1.8 (H) 0.0 per 100 WBC    Neutrophils % 61 36 - 65 %    Lymphocytes % 19 (L) 24 - 43 %    Monocytes % 6 3 - 12 %    Eosinophils % 2 1 - 4 %    Basophils % 1 0 - 2 %    Immature Granulocytes 11 (H) 0 %    Neutrophils Absolute 8.29 (H) 1.50 - 8.10 k/uL    Lymphocytes Absolute 2.58 1.10 - 3.70 k/uL    Monocytes Absolute 0.82 0.10 - 1.20 k/uL    Eosinophils Absolute 0.27 0.00 - 0.44 k/uL    Basophils Absolute 0.14 0.00 - 0.20 k/uL    Absolute Immature Granulocyte 1.50 (H) 0.00 - 0.30 k/uL    Morphology BASOPHILIC STIPPLING PRESENT    Hemoglobin and Hematocrit    Collection Time: 01/23/24  3:00 PM   Result Value Ref Range    Hemoglobin 7.0 (LL) 13.0 - 17.0 g/dL    Hematocrit 21.2 (L) 40.7 - 50.3 %   POC Glucose Fingerstick    Collection Time: 01/23/24  3:46 PM   Result Value Ref Range    POC Glucose 112 (H) 75 - 110 mg/dL       Imaging/Diagnostics:  CT HEAD WO CONTRAST    Result Date: 1/21/2024  No acute intracranial abnormality. No acute bony abnormality of the cervical spine.     CT CERVICAL SPINE WO

## 2024-01-23 NOTE — ED NOTES
Patient presents to ER from home due to seats and Dizziness. Patient was seen on 1/21 and Dx with anemia. Patient was prescribed iron. Patient states S/S have worsen. Patient appears flush/pale in appearance. Patient states chest pain that started today with nausea. Patient is A/O x 4, equal chest expansion with non labored breathing, wheels locked, bed in lowest position, call light in reach.Patient placed on cardiac monitor and given warm blankets.

## 2024-01-23 NOTE — OP NOTE
EGD NOTE      Patient:   Nicholas Martinez    :    2000    Facility:   [facility]  Referring/PCP: No primary care provider on file.    Procedure:   Esophagogastroduodenoscopy with control of bleeding  Date:     2024   Endoscopist:  Josiah Del Toro D.O.  Assistant:                  None    Preoperative Diagnosis:     Melena  Acute anemia    Postoperative Diagnosis:    Small bowel mass  Duodenal erosions  Esophageal nodules    Anesthesia:  MAC    Complications: None    Description of Procedure:  Informed consent was obtained after explanation of the procedure including indications, description of the procedure,  benefits and possible risks and complications of the procedure, and alternatives. Questions were answered.  The patient's history was reviewed and a directed physical examination was performed prior to the procedure.    Patient was monitored throughout the procedure with pulse oximetry and periodic assessment of vital signs. Patient was sedated as noted above. With the patient in the left lateral decubitus position, the Olympus videoendoscope was placed in the patient's mouth and under direct visualization passed into the esophagus.  Visualization of the esophagus, stomach, and duodenum was performed during both introduction and withdrawal of the endoscope and retroflexed view of the proximal stomach was obtained. The scope was passed to the 2nd portion of the duodenum.  The patient tolerated the procedure well and was taken to the recovery area in good condition.    EBL: minimal  Specimen:  gastric and GE jxn.  Implants: None      Findings::   Esophagus: Several small irregular appearing nodules at GE jxn.  Biopsied.   Stomach: normal, biopsied.  Duodenum: Several small bulbar erosions.  Distal duodenal mass like lesion (around 2 cm) with active bleeding associated with it.  Needed peds colonoscope to access the lesion.  Oozing blood from surface of lesion.  Mass was atypical and not classic for  submucosal or malignancy.  Hemostasis achieved using epi injection, Bipolar cautery, and 2 hemoclips.    Recommendations:   -PPI drip.  -Clears only for now.  -Await path from esophagus and stomach.  -He will need a repeat EGD to biopsy small bowel mass in the next few weeks.  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  Possible relationship?  -Blood transfusion as needed.      Electronically signed by Josiah Del Toro MD, D.O. on 1/23/2024 at 1:02 PM

## 2024-01-23 NOTE — CONSENT
Informed Consent for Blood Component Transfusion Note    I have discussed with the patient the rationale for blood component transfusion; its benefits in treating or preventing fatigue, organ damage, or death; and its risk which includes mild transfusion reactions, rare risk of blood borne infection, or more serious but rare reactions. I have discussed the alternatives to transfusion, including the risk and consequences of not receiving transfusion. The patient had an opportunity to ask questions and had agreed to proceed with transfusion of blood components.    Electronically signed by Ten Donato MD on 1/23/24 at 11:19 AM EST

## 2024-01-23 NOTE — H&P (VIEW-ONLY)
Expiration Date [id number]     Transfusion Status OK TO TRANSFUSE     Crossmatch Result COMPATIBLE     Unit Number [id number]     Component Leukocyte Reduced Red Cell     Unit Divison 00     Dispense Status Blood Bank ALLOCATED     Transfusion Status OK TO TRANSFUSE     Crossmatch Result COMPATIBLE    Blood Occult Stool Screen #1    Collection Time: 01/23/24 10:42 AM   Result Value Ref Range    Occult Blood, Stool #1 POSITIVE (A) NEGATIVE    Date, Stool #1 1     Time, Stool #1 1,042    CBC with Auto Differential    Collection Time: 01/23/24 11:03 AM   Result Value Ref Range    WBC 13.6 (H) 3.5 - 11.3 k/uL    RBC 1.95 (L) 4.21 - 5.77 m/uL    Hemoglobin 5.6 (LL) 13.0 - 17.0 g/dL    Hematocrit 17.0 (L) 40.7 - 50.3 %    MCV 87.2 82.6 - 102.9 fL    MCH 28.7 25.2 - 33.5 pg    MCHC 32.9 28.4 - 34.8 g/dL    RDW 15.7 (H) 11.8 - 14.4 %    Platelets 254 138 - 453 k/uL    MPV 10.4 8.1 - 13.5 fL    NRBC Automated 1.8 (H) 0.0 per 100 WBC    Neutrophils % 61 36 - 65 %    Lymphocytes % 19 (L) 24 - 43 %    Monocytes % 6 3 - 12 %    Eosinophils % 2 1 - 4 %    Basophils % 1 0 - 2 %    Immature Granulocytes 11 (H) 0 %    Neutrophils Absolute 8.29 (H) 1.50 - 8.10 k/uL    Lymphocytes Absolute 2.58 1.10 - 3.70 k/uL    Monocytes Absolute 0.82 0.10 - 1.20 k/uL    Eosinophils Absolute 0.27 0.00 - 0.44 k/uL    Basophils Absolute 0.14 0.00 - 0.20 k/uL    Absolute Immature Granulocyte 1.50 (H) 0.00 - 0.30 k/uL    Morphology BASOPHILIC STIPPLING PRESENT    Hemoglobin and Hematocrit    Collection Time: 01/23/24  3:00 PM   Result Value Ref Range    Hemoglobin 7.0 (LL) 13.0 - 17.0 g/dL    Hematocrit 21.2 (L) 40.7 - 50.3 %   POC Glucose Fingerstick    Collection Time: 01/23/24  3:46 PM   Result Value Ref Range    POC Glucose 112 (H) 75 - 110 mg/dL       Imaging/Diagnostics:  CT HEAD WO CONTRAST    Result Date: 1/21/2024  No acute intracranial abnormality. No acute bony abnormality of the cervical spine.     CT CERVICAL SPINE WO  CONTRAST    Result Date: 1/21/2024  No acute intracranial abnormality. No acute bony abnormality of the cervical spine.     XR CHEST PORTABLE    Result Date: 1/21/2024  No acute process.       Assessment :      Hospital Problems             Last Modified POA    * (Principal) Acute GI bleeding 1/23/2024 Yes       Plan:     Patient status inpatient in the  Progressive Unit/Step down    Acute GI bleeding  Type cross and transfuse 1 unit PRBC now  Consult GI and anticipate OR this afternoon  Check iron TIBC  Continue Protonix drip  No more Motrin    Consultations:   IP CONSULT TO INTERNAL MEDICINE  IP CONSULT TO GI  IP CONSULT TO GI    Patient is admitted as inpatient status because of co-morbidities listed above, severity of signs and symptoms as outlined, requirement for current medical therapies and most importantly because of direct risk to patient if care not provided in a hospital setting.  Expected length of stay > 48 hours.    XANDER Obrien - AKI  1/23/2024  3:55 PM    Copy sent to Dr. Ornelas primary care provider on file.

## 2024-01-23 NOTE — ED PROVIDER NOTES
ondansetron (ZOFRAN) injection 4 mg    0.9 % sodium chloride infusion    DISCONTD: pantoprazole (PROTONIX) 80 mg in sodium chloride 0.9 % 50 mL bolus    pantoprazole (PROTONIX) 80 mg in sodium chloride (PF) 0.9 % 20 mL injection     DISCHARGE PRESCRIPTIONS:  New Prescriptions    No medications on file     PHYSICIAN CONSULTS ORDERED THIS ENCOUNTER:  IP CONSULT TO INTERNAL MEDICINE  IP CONSULT TO GI  IP CONSULT TO GI  FINAL IMPRESSION      1. Gastrointestinal hemorrhage with melena    2. Anemia, unspecified type          DISPOSITION/PLAN   DISPOSITION Admitted 01/23/2024 11:59:21 AM      OUTPATIENT FOLLOW UP THE PATIENT:  No follow-up provider specified.    MD Tanmay Jauregui Joseph R, MD  01/23/24 8549

## 2024-01-23 NOTE — ANESTHESIA POSTPROCEDURE EVALUATION
Department of Anesthesiology  Postprocedure Note    Patient: Nicholas Martinez  MRN: [id number]  YOB: 2000  Date of evaluation: 1/23/2024    Procedure Summary       Date: 01/23/24 Room / Location: [address]    Anesthesia Start: 1321 Anesthesia Stop: 1357    Procedure: EGD BIOPSY (Esophagus) Diagnosis:       Upper GI bleed      (Upper GI bleed [K92.2])    Surgeons: Josiah Del Toro MD Responsible Provider: Zhao Remy DO    Anesthesia Type: MAC, general ASA Status: 4            Anesthesia Type: No value filed.    Rush Phase I: Rush Score: 5    Rush Phase II:      Anesthesia Post Evaluation    Patient location during evaluation: PACU  Patient participation: complete - patient participated  Level of consciousness: awake and alert  Airway patency: patent  Nausea & Vomiting: no nausea and no vomiting  Cardiovascular status: hemodynamically stable  Respiratory status: acceptable  Hydration status: stable  Pain management: adequate    No notable events documented.

## 2024-01-23 NOTE — PROGRESS NOTES
End Of Shift Note  [place] ICU  Summary of shift: Admitted from PACU this afternoon post EGD.  Patient is alert and oriented x4, welcomed to the unit, attached to the monitor, BP stable, .  Saturating well on room air.  Started on normal saline 75 mls/hour as ordered.  Hb  post transfusion came back as 7.0, NP Kenneth made aware.  To transfuse another unit of blood, started.  Vital has been stable during the initial 15 mins.  No further episode of GI bleed noted. Patient feels better as claimed.    Vitals:    Vitals:    01/23/24 1740 01/23/24 1742 01/23/24 1758 01/23/24 1800   BP: 129/69 129/69 (!) 140/6 (!) 140/68   Pulse: (!) 131 (!) 124 (!) 128 (!) 129   Resp: 21 18 16 19   Temp: 99.1 °F (37.3 °C) 99.2 °F (37.3 °C) 98.8 °F (37.1 °C)    TempSrc: Axillary  Axillary    SpO2: 99% 100% 99% 99%        I&O:   Intake/Output Summary (Last 24 hours) at 1/23/2024 1810  Last data filed at 1/23/2024 1612  Gross per 24 hour   Intake 725 ml   Output 300 ml   Net 425 ml       Resp Status: Remains on room air, saturating 100%, No SOB noted.    Ventilator Settings:     / / /     Critical Care IV infusions:   sodium chloride      sodium chloride      sodium chloride 75 mL/hr at 01/23/24 1628    sodium chloride      pantoprazole (PROTONIX) 80 mg in sodium chloride 0.9 % 100 mL infusion 8 mg/hr (01/23/24 1628)    sodium chloride          LDA:   Peripheral IV 01/23/24 Right Antecubital (Active)   Number of days: 0       Peripheral IV 01/23/24 Left Antecubital (Active)   Number of days: 0

## 2024-01-23 NOTE — PLAN OF CARE
Problem: Discharge Planning  Goal: Discharge to home or other facility with appropriate resources  1/23/2024 1751 by Wendi Zacarias RN  Outcome: Progressing  1/23/2024 1750 by Wendi Zacarias RN  Outcome: Progressing     Problem: Pain  Goal: Verbalizes/displays adequate comfort level or baseline comfort level  1/23/2024 1751 by Wendi Zacarias RN  Outcome: Progressing  1/23/2024 1750 by Wendi Zacarias RN  Outcome: Progressing     Problem: Skin/Tissue Integrity  Goal: Absence of new skin breakdown  Description: 1.  Monitor for areas of redness and/or skin breakdown  2.  Assess vascular access sites hourly  3.  Every 4-6 hours minimum:  Change oxygen saturation probe site  4.  Every 4-6 hours:  If on nasal continuous positive airway pressure, respiratory therapy assess nares and determine need for appliance change or resting period.  1/23/2024 1751 by Wendi Zacarias RN  Outcome: Progressing  1/23/2024 1750 by Wendi Zacarias RN  Outcome: Progressing     Problem: Safety - Adult  Goal: Free from fall injury  1/23/2024 1751 by Wendi Zacarias RN  Outcome: Progressing  1/23/2024 1750 by Wendi Zacarias RN  Outcome: Progressing     Problem: ABCDS Injury Assessment  Goal: Absence of physical injury  1/23/2024 1751 by Wendi Zacarias RN  Outcome: Progressing  1/23/2024 1750 by Wendi Zacarias RN  Outcome: Progressing     Problem: Skin/Tissue Integrity - Adult  Goal: Skin integrity remains intact  Outcome: Progressing     Problem: Metabolic/Fluid and Electrolytes - Adult  Goal: Electrolytes maintained within normal limits  Outcome: Progressing  Goal: Hemodynamic stability and optimal renal function maintained  Outcome: Progressing     Problem: Hematologic - Adult  Goal: Maintains hematologic stability  Outcome: Progressing

## 2024-01-24 LAB
ABO/RH: NORMAL
ANION GAP SERPL CALCULATED.3IONS-SCNC: 10 MMOL/L (ref 9–17)
ANTIBODY SCREEN: NEGATIVE
ARM BAND NUMBER: NORMAL
BASOPHILS # BLD: 0.16 K/UL (ref 0–0.2)
BASOPHILS NFR BLD: 1 % (ref 0–2)
BLOOD BANK DISPENSE STATUS: NORMAL
BLOOD BANK DISPENSE STATUS: NORMAL
BLOOD BANK SAMPLE EXPIRATION: NORMAL
BPU ID: NORMAL
BPU ID: NORMAL
BUN SERPL-MCNC: 8 MG/DL (ref 6–20)
BUN/CREAT SERPL: 11 (ref 9–20)
CALCIUM SERPL-MCNC: 7.4 MG/DL (ref 8.6–10.4)
CHLORIDE SERPL-SCNC: 109 MMOL/L (ref 98–107)
CO2 SERPL-SCNC: 21 MMOL/L (ref 20–31)
COMPONENT: NORMAL
COMPONENT: NORMAL
CREAT SERPL-MCNC: 0.7 MG/DL (ref 0.7–1.2)
CROSSMATCH RESULT: NORMAL
CROSSMATCH RESULT: NORMAL
EOSINOPHIL # BLD: 0.16 K/UL (ref 0–0.44)
EOSINOPHILS RELATIVE PERCENT: 1 % (ref 1–4)
ERYTHROCYTE [DISTWIDTH] IN BLOOD BY AUTOMATED COUNT: 15.9 % (ref 11.8–14.4)
GFR SERPL CREATININE-BSD FRML MDRD: >60 ML/MIN/1.73M2
GLUCOSE SERPL-MCNC: 133 MG/DL (ref 70–99)
HCT VFR BLD AUTO: 22.7 % (ref 40.7–50.3)
HCT VFR BLD AUTO: 23.1 % (ref 40.7–50.3)
HCT VFR BLD AUTO: 23.2 % (ref 40.7–50.3)
HGB BLD-MCNC: 7.5 G/DL (ref 13–17)
HGB BLD-MCNC: 7.6 G/DL (ref 13–17)
HGB BLD-MCNC: 7.7 G/DL (ref 13–17)
IMM GRANULOCYTES # BLD AUTO: 1.47 K/UL (ref 0–0.3)
IMM GRANULOCYTES NFR BLD: 9 %
IRON SATN MFR SERPL: 47 % (ref 20–55)
IRON SERPL-MCNC: 121 UG/DL (ref 59–158)
LYMPHOCYTES NFR BLD: 2.93 K/UL (ref 1.1–3.7)
LYMPHOCYTES RELATIVE PERCENT: 18 % (ref 24–43)
MCH RBC QN AUTO: 29.9 PG (ref 25.2–33.5)
MCHC RBC AUTO-ENTMCNC: 32.3 G/DL (ref 28.4–34.8)
MCV RBC AUTO: 92.4 FL (ref 82.6–102.9)
MONOCYTES NFR BLD: 0.98 K/UL (ref 0.1–1.2)
MONOCYTES NFR BLD: 6 % (ref 3–12)
MORPHOLOGY: ABNORMAL
NEUTROPHILS NFR BLD: 65 % (ref 36–65)
NEUTS SEG NFR BLD: 10.6 K/UL (ref 1.5–8.1)
NRBC BLD-RTO: 1 PER 100 WBC
PLATELET # BLD AUTO: 243 K/UL (ref 138–453)
PMV BLD AUTO: 10.4 FL (ref 8.1–13.5)
POTASSIUM SERPL-SCNC: 3.6 MMOL/L (ref 3.7–5.3)
RBC # BLD AUTO: 2.51 M/UL (ref 4.21–5.77)
SODIUM SERPL-SCNC: 140 MMOL/L (ref 135–144)
TIBC SERPL-MCNC: 256 UG/DL (ref 250–450)
TRANSFUSION STATUS: NORMAL
TRANSFUSION STATUS: NORMAL
UNIT DIVISION: 0
UNIT DIVISION: 0
UNSATURATED IRON BINDING CAPACITY: 135 UG/DL (ref 112–347)
WBC OTHER # BLD: 16.3 K/UL (ref 3.5–11.3)

## 2024-01-24 PROCEDURE — 2060000000 HC ICU INTERMEDIATE R&B

## 2024-01-24 PROCEDURE — 2580000003 HC RX 258: Performed by: INTERNAL MEDICINE

## 2024-01-24 PROCEDURE — 85018 HEMOGLOBIN: CPT

## 2024-01-24 PROCEDURE — 6360000002 HC RX W HCPCS: Performed by: INTERNAL MEDICINE

## 2024-01-24 PROCEDURE — 85014 HEMATOCRIT: CPT

## 2024-01-24 PROCEDURE — 36415 COLL VENOUS BLD VENIPUNCTURE: CPT

## 2024-01-24 PROCEDURE — 94761 N-INVAS EAR/PLS OXIMETRY MLT: CPT

## 2024-01-24 PROCEDURE — 85025 COMPLETE CBC W/AUTO DIFF WBC: CPT

## 2024-01-24 PROCEDURE — 6370000000 HC RX 637 (ALT 250 FOR IP): Performed by: NURSE PRACTITIONER

## 2024-01-24 PROCEDURE — 80048 BASIC METABOLIC PNL TOTAL CA: CPT

## 2024-01-24 PROCEDURE — 2580000003 HC RX 258: Performed by: NURSE PRACTITIONER

## 2024-01-24 PROCEDURE — C9113 INJ PANTOPRAZOLE SODIUM, VIA: HCPCS | Performed by: INTERNAL MEDICINE

## 2024-01-24 PROCEDURE — 99239 HOSP IP/OBS DSCHRG MGMT >30: CPT | Performed by: NURSE PRACTITIONER

## 2024-01-24 RX ORDER — PANTOPRAZOLE SODIUM 40 MG/1
40 TABLET, DELAYED RELEASE ORAL
Qty: 90 TABLET | Refills: 1 | Status: SHIPPED | OUTPATIENT
Start: 2024-01-24 | End: 2024-01-25

## 2024-01-24 RX ORDER — POTASSIUM CHLORIDE 20 MEQ/1
40 TABLET, EXTENDED RELEASE ORAL ONCE
Status: COMPLETED | OUTPATIENT
Start: 2024-01-24 | End: 2024-01-24

## 2024-01-24 RX ADMIN — POTASSIUM CHLORIDE 40 MEQ: 1500 TABLET, EXTENDED RELEASE ORAL at 12:52

## 2024-01-24 RX ADMIN — PANTOPRAZOLE SODIUM 8 MG/HR: 40 INJECTION, POWDER, FOR SOLUTION INTRAVENOUS at 20:46

## 2024-01-24 RX ADMIN — SODIUM CHLORIDE: 9 INJECTION, SOLUTION INTRAVENOUS at 05:59

## 2024-01-24 RX ADMIN — PANTOPRAZOLE SODIUM 8 MG/HR: 40 INJECTION, POWDER, FOR SOLUTION INTRAVENOUS at 00:38

## 2024-01-24 RX ADMIN — PANTOPRAZOLE SODIUM 8 MG/HR: 40 INJECTION, POWDER, FOR SOLUTION INTRAVENOUS at 11:38

## 2024-01-24 RX ADMIN — SODIUM CHLORIDE: 9 INJECTION, SOLUTION INTRAVENOUS at 18:47

## 2024-01-24 RX ADMIN — ACETAMINOPHEN 650 MG: 325 TABLET ORAL at 16:06

## 2024-01-24 RX ADMIN — SODIUM CHLORIDE, PRESERVATIVE FREE 10 ML: 5 INJECTION INTRAVENOUS at 20:35

## 2024-01-24 ASSESSMENT — PAIN SCALES - GENERAL: PAINLEVEL_OUTOF10: 3

## 2024-01-24 ASSESSMENT — PAIN DESCRIPTION - PAIN TYPE: TYPE: ACUTE PAIN

## 2024-01-24 ASSESSMENT — PAIN DESCRIPTION - LOCATION: LOCATION: HEAD

## 2024-01-24 ASSESSMENT — PAIN DESCRIPTION - ORIENTATION: ORIENTATION: POSTERIOR

## 2024-01-24 ASSESSMENT — PAIN - FUNCTIONAL ASSESSMENT: PAIN_FUNCTIONAL_ASSESSMENT: ACTIVITIES ARE NOT PREVENTED

## 2024-01-24 ASSESSMENT — PAIN DESCRIPTION - DESCRIPTORS: DESCRIPTORS: ACHING

## 2024-01-24 NOTE — PROGRESS NOTES
GASTROENTEROLOGY NOTE       Patient:   Nicholas Martinez   :    2000   Facility:   [facility]  Date:     2024  Consultant:   XANDER Gonzales - NUBIA      SUBJECTIVE:    23 y.o. male admitted 2024 with Acute GI bleeding [K92.2]  Gastrointestinal hemorrhage with melena [K92.1]  Anemia, unspecified type [D64.9].      23-year-old male presenting with dizziness and melena, found to be severely anemic with hemoglobin of 5.6. He underwent an EGD yesterday with Dr. Del Toro:     Findings::   Esophagus: Several small irregular appearing nodules at GE jxn.  Biopsied.   Stomach: normal, biopsied.  Duodenum: Several small bulbar erosions.  Distal duodenal mass like lesion (around 2 cm) with active bleeding associated with it.  Needed peds colonoscope to access the lesion.  Oozing blood from surface of lesion.  Mass was atypical and not classic for submucosal or malignancy.  Hemostasis achieved using epi injection, Bipolar cautery, and 2 hemoclips.    Patient received a unit of blood. Hemoglobin has been stable overnight and he has had no overt signs of GI blood loss per nurse and patient.     OBJECTIVE:   Vital Signs:  /66   Pulse (!) 105   Temp 98.8 °F (37.1 °C) (Oral)   Resp 21   Ht 1.803 m (5' 11\")   Wt 92.2 kg (203 lb 4.2 oz)   SpO2 98%   BMI 28.35 kg/m²      Physical Exam:   General appearance: Alert, NAD  Lungs: CTA bilaterally, unlabored pattern  Heart: S1S2, RRR without murmur, clicks, gallops.  Abdomen: Soft, NT, ND +BS, no masses  Skin/Musculoskeletal:  No jaundice. No clubbing, cyanosis, or edema.  ROM normal.      Lab and Imaging Review   Recent Labs     24  1725 24  1017 24  1103 24  1500 24  2113 24  0339   WBC 12.1*  --  13.6*  --   --  16.3*   HGB 9.1*  --  5.6* 7.0* 7.7* 7.5*   MCV 83.6  --  87.2  --   --  92.4     --  254  --   --  243   INR  --  1.2  --   --   --   --     139  --   --   --  140   K 4.0 3.7  --   --

## 2024-01-24 NOTE — PLAN OF CARE
Problem: Discharge Planning  Goal: Discharge to home or other facility with appropriate resources  1/24/2024 0643 by Sahara Rogers RN  Outcome: Progressing  1/23/2024 2344 by Sahara Rogers RN  Outcome: Progressing  Flowsheets (Taken 1/23/2024 2000)  Discharge to home or other facility with appropriate resources: Identify barriers to discharge with patient and caregiver  1/23/2024 1751 by Wendi Zacarias RN  Outcome: Progressing  1/23/2024 1750 by Wendi Zacarias RN  Outcome: Progressing     Problem: Pain  Goal: Verbalizes/displays adequate comfort level or baseline comfort level  1/24/2024 0643 by Sahara Rogers RN  Outcome: Progressing  1/23/2024 2344 by Sahara Rogers RN  Outcome: Progressing  Flowsheets (Taken 1/23/2024 2000)  Verbalizes/displays adequate comfort level or baseline comfort level:   Encourage patient to monitor pain and request assistance   Assess pain using appropriate pain scale   Administer analgesics based on type and severity of pain and evaluate response   Implement non-pharmacological measures as appropriate and evaluate response  1/23/2024 1751 by Wendi Zacarias RN  Outcome: Progressing  1/23/2024 1750 by Wendi Zacarias RN  Outcome: Progressing     Problem: Skin/Tissue Integrity  Goal: Absence of new skin breakdown  Description: 1.  Monitor for areas of redness and/or skin breakdown  2.  Assess vascular access sites hourly  3.  Every 4-6 hours minimum:  Change oxygen saturation probe site  4.  Every 4-6 hours:  If on nasal continuous positive airway pressure, respiratory therapy assess nares and determine need for appliance change or resting period.  1/24/2024 0643 by Sahara Rogers, RN  Outcome: Progressing  1/23/2024 2344 by Sahara Rogers RN  Outcome: Progressing  1/23/2024 1751 by Wendi Zacarias RN  Outcome: Progressing  1/23/2024 1750 by Wendi Zacarias  LUZ Betancur  Outcome: Progressing     Problem: Safety - Adult  Goal: Free from fall injury  1/24/2024 0643 by Sahara Rogers RN  Outcome: Progressing  1/23/2024 2344 by Sahara Rogers RN  Outcome: Progressing  Flowsheets (Taken 1/23/2024 2341)  Free From Fall Injury: Instruct family/caregiver on patient safety  1/23/2024 1751 by Wendi Zacarias RN  Outcome: Progressing  1/23/2024 1750 by Wendi Zacarias RN  Outcome: Progressing     Problem: ABCDS Injury Assessment  Goal: Absence of physical injury  1/24/2024 0643 by Sahara Rogers RN  Outcome: Progressing  1/23/2024 2344 by Sahara Rogers RN  Outcome: Progressing  Flowsheets (Taken 1/23/2024 2341)  Absence of Physical Injury: Implement safety measures based on patient assessment  1/23/2024 1751 by Wendi Zacarias RN  Outcome: Progressing  1/23/2024 1750 by Wendi Zacarias RN  Outcome: Progressing     Problem: Skin/Tissue Integrity - Adult  Goal: Skin integrity remains intact  1/24/2024 0643 by Sahara Rogers RN  Outcome: Progressing  1/23/2024 2344 by Sahara Rogers RN  Outcome: Progressing  Flowsheets  Taken 1/23/2024 2341  Skin Integrity Remains Intact:   Monitor for areas of redness and/or skin breakdown   Assess vascular access sites hourly  Taken 1/23/2024 2000  Skin Integrity Remains Intact:   Monitor for areas of redness and/or skin breakdown   Assess vascular access sites hourly  1/23/2024 1751 by Wendi Zacarias RN  Outcome: Progressing     Problem: Metabolic/Fluid and Electrolytes - Adult  Goal: Electrolytes maintained within normal limits  1/24/2024 0643 by Sahara Rogers RN  Outcome: Progressing  1/23/2024 2344 by Sahara Rogers RN  Outcome: Progressing  Flowsheets (Taken 1/23/2024 2000)  Electrolytes maintained within normal limits:   Monitor labs and assess patient for signs and symptoms of electrolyte imbalances   Administer electrolyte

## 2024-01-24 NOTE — PLAN OF CARE
VSS, Hgb being rechecked at this time per lab, pt c/o HA when machines beep and pt enc to call staff if IV beeps, pt agreed.  Problem: Discharge Planning  Goal: Discharge to home or other facility with appropriate resources  1/24/2024 1147 by Kamila Jean RN  Outcome: Progressing  Flowsheets (Taken 1/24/2024 0915)  Discharge to home or other facility with appropriate resources:   Identify barriers to discharge with patient and caregiver   Identify discharge learning needs (meds, wound care, etc)  1/24/2024 0643 by Sahara Rogers RN  Outcome: Progressing  1/23/2024 2344 by Sahara Rogers RN  Outcome: Progressing  Flowsheets (Taken 1/23/2024 2000)  Discharge to home or other facility with appropriate resources: Identify barriers to discharge with patient and caregiver     Problem: Pain  Goal: Verbalizes/displays adequate comfort level or baseline comfort level  1/24/2024 1147 by Kamila Jean RN  Outcome: Progressing  Flowsheets (Taken 1/24/2024 0915)  Verbalizes/displays adequate comfort level or baseline comfort level:   Encourage patient to monitor pain and request assistance   Assess pain using appropriate pain scale   Administer analgesics based on type and severity of pain and evaluate response  1/24/2024 0643 by Sahara Rogers RN  Outcome: Progressing  1/23/2024 2344 by Sahara Rogers RN  Outcome: Progressing  Flowsheets (Taken 1/23/2024 2000)  Verbalizes/displays adequate comfort level or baseline comfort level:   Encourage patient to monitor pain and request assistance   Assess pain using appropriate pain scale   Administer analgesics based on type and severity of pain and evaluate response   Implement non-pharmacological measures as appropriate and evaluate response     Problem: Skin/Tissue Integrity  Goal: Absence of new skin breakdown  Description: 1.  Monitor for areas of redness and/or skin breakdown  2.  Assess vascular access sites hourly  3.  Every 4-6 hours minimum:  Change  Progressing  Flowsheets (Taken 1/24/2024 0915)  Electrolytes maintained within normal limits:   Administer electrolyte replacement as ordered   Monitor labs and assess patient for signs and symptoms of electrolyte imbalances   Monitor response to electrolyte replacements, including repeat lab results as appropriate  1/24/2024 0643 by Sahara Rogers RN  Outcome: Progressing  1/23/2024 2344 by Sahara Rogers RN  Outcome: Progressing  Flowsheets (Taken 1/23/2024 2000)  Electrolytes maintained within normal limits:   Monitor labs and assess patient for signs and symptoms of electrolyte imbalances   Administer electrolyte replacement as ordered   Monitor response to electrolyte replacements, including repeat lab results as appropriate  Goal: Hemodynamic stability and optimal renal function maintained  1/24/2024 1147 by Kamila Jean RN  Outcome: Progressing  Flowsheets (Taken 1/24/2024 0915)  Hemodynamic stability and optimal renal function maintained:   Monitor labs and assess for signs and symptoms of volume excess or deficit   Monitor intake, output and patient weight   Monitor response to interventions for patient's volume status, including labs, urine output, blood pressure (other measures as available)  1/24/2024 0643 by Sahara Rogers RN  Outcome: Progressing  1/23/2024 2344 by Sahara Rogers RN  Outcome: Progressing  Flowsheets (Taken 1/23/2024 2000)  Hemodynamic stability and optimal renal function maintained:   Monitor labs and assess for signs and symptoms of volume excess or deficit   Monitor intake, output and patient weight     Problem: Hematologic - Adult  Goal: Maintains hematologic stability  1/24/2024 1147 by Kamila Jean RN  Outcome: Progressing  Flowsheets (Taken 1/24/2024 0915)  Maintains hematologic stability:   Assess for signs and symptoms of bleeding or hemorrhage   Monitor labs for bleeding or clotting disorders  1/24/2024 0643 by Sahara Rogers RN  Outcome:

## 2024-01-24 NOTE — PROGRESS NOTES
SPIRITUAL CARE DEPARTMENT [facility]  PROGRESS NOTE    Room # 1101/1101-01   Name: Nicholas Martinez            Gnosticist: None     Reason for visit:  Rounding    I visited the patient.    Admit Date & Time: 1/23/2024  9:57 AM    Assessment:  Nicholas Martinez is a 23 y.o. male in the hospital because GI Bleeding. Upon entering the room The patient was sitting up in the bed, alert.      Intervention:  I introduced myself and my title as  I offered space for the patient  to express feelings, needs, and concerns and provided a ministry presence. [facility] offered prayer and spiritual services to talk etc.    Outcome:  The patient declined.    Plan:  Chaplains will remain available to offer spiritual and emotional support as needed.    Electronically signed by Chaplain oDrene, on 1/24/2024 at 1:13 PM.  Spiritual Care Department  [facility]

## 2024-01-24 NOTE — PROGRESS NOTES
[facility]  Office: [phone]  Sunday Bello DO, Miguel A Quinn DO, Rodolfo Parr DO, Colton Yepez DO, Earl Hu MD, Aye Saavedra MD, Silverio Oshea MD, Leydi Barnett MD,  Albino Landis MD, Maribell Emery MD, Ankit Corral MD,  Jessica Peoples DO, Patria Echevarria MD, Renzo Nobles MD, Js Bello DO, Joselyn Richards MD,  Perfecto Younger DO, Maya Sorensen MD, Sierra Peck MD, Jazz Burris MD, Karthikeyan Guardado MD,  Bubba Ferrera MD, Mary Chapa MD, Jossie Landers MD, Sal Celestin MD, Gonzales Bravo MD, Kaykay Barakat MD, Roberto Tobin DO, Louie Polanco DO, Linda Sarkar MD,  Malik Jeffery MD, Shirley Waterhouse, CNP,  Mami Guillen CNP, Mike Collins, CNP,  Monae Anderson, PIERCE, Belia Au, CNP, Tanya Wilson, CNP, Pamela Fulton CNP, Ml Lee CNP, Carol Saab, CNP, Hanane Wild, PA-C, Dora Arauz PA-C, Taryn Valenzuela, CNP, Shaye Verde, CNS, Nancy Lagos, CNP, Iman Phoenix CNP, Tracy Schwab, CNP         [facility]   IN-PATIENT SERVICE   [facility]    Progress Note    1/24/2024    10:22 AM    Name:   Nicholas Martinez  MRN:     [id number]     Acct:      [id number]   Room:   [facility]/1101-01   Day:  1  Admit Date:  1/23/2024  9:57 AM    PCP:   No primary care provider on file.  Code Status:  Full Code    Subjective:     C/C:   Chief Complaint   Patient presents with    Dizziness     Seen on 1/21 for same    Sweats     Interval History Status: improved.     Patient condition has improved.  Hemoglobin has been stable greater than 7 since yesterday afternoon.  EGD results are reviewed and patient should remain on Protonix and avoid NSAIDs and plan for repeat EGD in 2 weeks as an outpatient.  Diet advancement per GI.  If diet is advanced and patient's hemoglobin remained stable he can likely be discharged this afternoon with outpatient follow-up.    Brief History:     1/23 - Patient was seen 48 hours ago for near syncope.  Was  --    LABALBU 3.9 3.5  --    AST 16 15  --    ALT 31 19  --    ALKPHOS 48 40  --    BILITOT 0.2* 0.1*  --    BILIDIR <0.1  --   --    POCGLU  --   --  112*     ABG:No results found for: \"POCPH\", \"PHART\", \"PH\", \"POCPCO2\", \"MOW8HTL\", \"PCO2\", \"POCPO2\", \"PO2ART\", \"PO2\", \"POCHCO3\", \"RBS1VBE\", \"HCO3\", \"NBEA\", \"PBEA\", \"BEART\", \"BE\", \"THGBART\", \"THB\", \"EVA6OBH\", \"TKZD9EAE\", \"M3TMHWIX\", \"O2SAT\", \"FIO2\"  No results found for: \"SPECIAL\"  No results found for: \"CULTURE\"    Radiology:  CT HEAD WO CONTRAST    Result Date: 1/21/2024  No acute intracranial abnormality. No acute bony abnormality of the cervical spine.     CT CERVICAL SPINE WO CONTRAST    Result Date: 1/21/2024  No acute intracranial abnormality. No acute bony abnormality of the cervical spine.     XR CHEST PORTABLE    Result Date: 1/21/2024  No acute process.       Physical Examination:        General appearance:  alert, cooperative and no distress  Mental Status:  oriented to person, place and time and normal affect  Lungs:  clear to auscultation bilaterally, normal effort  Heart:  regular rate and rhythm, no murmur  Abdomen:  soft, nontender, nondistended, normal bowel sounds, no masses, hepatomegaly, splenomegaly  Extremities:  no edema, redness, tenderness in the calves  Skin:  no gross lesions, rashes, induration    Assessment:        Hospital Problems             Last Modified POA    * (Principal) Acute GI bleeding 1/23/2024 Yes       Plan:        Acute GI bleeding status post EGD  Continue to trend H&H  Diet advancement per GI  Continue Protonix drip  No more Motrin  Outpatient follow-up with GI in 2 weeks for repeat EGD  Possible discharge this afternoon if diet is advanced and hemoglobin remained stable    Mike CollinsXANDER NP  1/24/2024  10:22 AM

## 2024-01-24 NOTE — PLAN OF CARE
Problem: Discharge Planning  Goal: Discharge to home or other facility with appropriate resources  1/23/2024 2344 by Sahara Rogers RN  Outcome: Progressing  Flowsheets (Taken 1/23/2024 2000)  Discharge to home or other facility with appropriate resources: Identify barriers to discharge with patient and caregiver  1/23/2024 1751 by Wendi Zacarias RN  Outcome: Progressing  1/23/2024 1750 by Wendi Zacarias RN  Outcome: Progressing     Problem: Pain  Goal: Verbalizes/displays adequate comfort level or baseline comfort level  1/23/2024 2344 by Sahara Rogers RN  Outcome: Progressing  Flowsheets (Taken 1/23/2024 2000)  Verbalizes/displays adequate comfort level or baseline comfort level:   Encourage patient to monitor pain and request assistance   Assess pain using appropriate pain scale   Administer analgesics based on type and severity of pain and evaluate response   Implement non-pharmacological measures as appropriate and evaluate response  1/23/2024 1751 by Wendi Zacarias RN  Outcome: Progressing  1/23/2024 1750 by Wendi Zacarias RN  Outcome: Progressing     Problem: Skin/Tissue Integrity  Goal: Absence of new skin breakdown  Description: 1.  Monitor for areas of redness and/or skin breakdown  2.  Assess vascular access sites hourly  3.  Every 4-6 hours minimum:  Change oxygen saturation probe site  4.  Every 4-6 hours:  If on nasal continuous positive airway pressure, respiratory therapy assess nares and determine need for appliance change or resting period.  1/23/2024 2344 by Sahara Rogers RN  Outcome: Progressing  1/23/2024 1751 by Wendi Zacarias RN  Outcome: Progressing  1/23/2024 1750 by Wendi Zacarias RN  Outcome: Progressing     Problem: Safety - Adult  Goal: Free from fall injury  1/23/2024 2344 by Sahara Rogers RN  Outcome: Progressing  Flowsheets (Taken 1/23/2024  Monitor labs and assess for signs and symptoms of volume excess or deficit   Monitor intake, output and patient weight  1/23/2024 1751 by Wendi Zacarias RN  Outcome: Progressing     Problem: Hematologic - Adult  Goal: Maintains hematologic stability  1/23/2024 2344 by Sahara Rogers RN  Outcome: Progressing  Flowsheets (Taken 1/23/2024 2000)  Maintains hematologic stability:   Assess for signs and symptoms of bleeding or hemorrhage   Monitor labs for bleeding or clotting disorders   Administer blood products/factors as ordered  1/23/2024 1751 by Wendi Zacarias RN  Outcome: Progressing

## 2024-01-24 NOTE — PROGRESS NOTES
Transitions of Care Pharmacy Service   Medication Review    The patient's list of current home medications has been reviewed.     Source(s) of information: spoke to patient and sure scripts     Based on information provided by the above source(s), I have updated the patient's home med list as described below.       I changed or updated the following medications on the patient's home medication list:  Discontinued None      Added None      Adjusted   None      Other Notes None            Please feel free to call me with any questions about this encounter. Thank you.    This note will be reviewed and co-signed by the [facility] Pharmacist. The pharmacist will review inpatient orders and contact the physician about any discrepancies.    Corky Sneed, pharmacy technician  Transitions [facility] Pharmacy Service  Phone:  [phone]  Fax: [phone]      Electronically signed by Corky Sneed on 1/24/2024 at 11:29 AM       Prior to Admission medications    Medication Sig   Ferrous Sulfate (IRON) 325 (65 Fe) MG TABS Take 1 tablet by mouth daily

## 2024-01-24 NOTE — CARE COORDINATION
Case Management Assessment  Initial Evaluation    Date/Time of Evaluation: 1/24/2024 10:53 AM  Assessment Completed by: Milka Rodgers RN    If patient is discharged prior to next notation, then this note serves as note for discharge by case management.    Patient Name: Nicholas Martinez                   YOB: 2000  Diagnosis: Acute GI bleeding [K92.2]  Gastrointestinal hemorrhage with melena [K92.1]  Anemia, unspecified type [D64.9]                   Date / Time: 1/23/2024  9:57 AM    Patient Admission Status: Inpatient   Readmission Risk (Low < 19, Mod (19-27), High > 27): Readmission Risk Score: 10.9    Current PCP: No primary care provider on file.  PCP verified by CM? Yes (Will see new PCP on 2/26. Not sure of PCP name.)    Chart Reviewed: Yes      History Provided by: Patient  Patient Orientation: Alert and Oriented, Person, Place, Situation, Self    Patient Cognition: Alert    Hospitalization in the last 30 days (Readmission):  No    If yes, Readmission Assessment in CM Navigator will be completed.    Advance Directives:      Code Status: Full Code   Patient's Primary Decision Maker is: Legal Next of Kin      Discharge Planning:    Patient lives with: Parent Type of Home: Apartment  Primary Care Giver:    Patient Support Systems include: Parent   Current Financial resources: None  Current community resources: None  Current services prior to admission: None            Current DME:              Type of Home Care services:  None    ADLS  Prior functional level: Assistance with the following:, Shopping, Housework, Cooking  Current functional level: Assistance with the following:, Cooking, Housework, Shopping    PT AM-PAC:   /24  OT AM-PAC:   /24    Family can provide assistance at DC: Yes  Would you like Case Management to discuss the discharge plan with any other family members/significant others, and if so, who? Yes (mother Karol)  Plans to Return to Present Housing: Yes  Other Identified

## 2024-01-25 VITALS
WEIGHT: 203.26 LBS | HEART RATE: 96 BPM | RESPIRATION RATE: 19 BRPM | TEMPERATURE: 98.6 F | OXYGEN SATURATION: 99 % | HEIGHT: 71 IN | DIASTOLIC BLOOD PRESSURE: 58 MMHG | SYSTOLIC BLOOD PRESSURE: 130 MMHG | BODY MASS INDEX: 28.46 KG/M2

## 2024-01-25 LAB
EKG ATRIAL RATE: 116 BPM
EKG P AXIS: 53 DEGREES
EKG P-R INTERVAL: 120 MS
EKG Q-T INTERVAL: 358 MS
EKG QRS DURATION: 84 MS
EKG QTC CALCULATION (BAZETT): 497 MS
EKG R AXIS: 33 DEGREES
EKG T AXIS: 44 DEGREES
EKG VENTRICULAR RATE: 116 BPM
HCT VFR BLD AUTO: 21.9 % (ref 40.7–50.3)
HCT VFR BLD AUTO: 22 % (ref 40.7–50.3)
HGB BLD-MCNC: 7.1 G/DL (ref 13–17)
HGB BLD-MCNC: 7.2 G/DL (ref 13–17)
SURGICAL PATHOLOGY REPORT: NORMAL

## 2024-01-25 PROCEDURE — 6360000002 HC RX W HCPCS: Performed by: INTERNAL MEDICINE

## 2024-01-25 PROCEDURE — 36415 COLL VENOUS BLD VENIPUNCTURE: CPT

## 2024-01-25 PROCEDURE — 99239 HOSP IP/OBS DSCHRG MGMT >30: CPT | Performed by: NURSE PRACTITIONER

## 2024-01-25 PROCEDURE — 6370000000 HC RX 637 (ALT 250 FOR IP): Performed by: NURSE PRACTITIONER

## 2024-01-25 PROCEDURE — 85014 HEMATOCRIT: CPT

## 2024-01-25 PROCEDURE — 94761 N-INVAS EAR/PLS OXIMETRY MLT: CPT

## 2024-01-25 PROCEDURE — 2580000003 HC RX 258: Performed by: NURSE PRACTITIONER

## 2024-01-25 PROCEDURE — 85018 HEMOGLOBIN: CPT

## 2024-01-25 PROCEDURE — C9113 INJ PANTOPRAZOLE SODIUM, VIA: HCPCS | Performed by: INTERNAL MEDICINE

## 2024-01-25 PROCEDURE — 2580000003 HC RX 258: Performed by: INTERNAL MEDICINE

## 2024-01-25 RX ORDER — PANTOPRAZOLE SODIUM 40 MG/1
40 TABLET, DELAYED RELEASE ORAL 2 TIMES DAILY
Qty: 90 TABLET | Refills: 1 | Status: SHIPPED | OUTPATIENT
Start: 2024-01-25

## 2024-01-25 RX ADMIN — SODIUM CHLORIDE: 9 INJECTION, SOLUTION INTRAVENOUS at 08:59

## 2024-01-25 RX ADMIN — PANTOPRAZOLE SODIUM 8 MG/HR: 40 INJECTION, POWDER, FOR SOLUTION INTRAVENOUS at 07:23

## 2024-01-25 RX ADMIN — ACETAMINOPHEN 650 MG: 325 TABLET ORAL at 07:43

## 2024-01-25 ASSESSMENT — PAIN SCALES - GENERAL
PAINLEVEL_OUTOF10: 0
PAINLEVEL_OUTOF10: 0
PAINLEVEL_OUTOF10: 3

## 2024-01-25 ASSESSMENT — PAIN DESCRIPTION - LOCATION: LOCATION: HEAD

## 2024-01-25 NOTE — PROGRESS NOTES
End Of Shift Note  [facility]  Summary of shift: Pt remains A&O x4. No signs of bleeding noted. Good urine output. Pt denies pain or dizziness. Protonix continues at 8 mg/ hr. Plan is to D/c home and avoid motrin.     Vitals:    Vitals:    01/25/24 0435 01/25/24 0500 01/25/24 0600 01/25/24 0722   BP:       Pulse: 92 93 (!) 114    Resp:  18 17    Temp:   98.6 °F (37 °C) 99.1 °F (37.3 °C)   TempSrc:   Oral Oral   SpO2:  97% 98%    Weight:       Height:            I&O:   Intake/Output Summary (Last 24 hours) at 1/25/2024 0728  Last data filed at 1/25/2024 0508  Gross per 24 hour   Intake 4075 ml   Output 3000 ml   Net 1075 ml       Resp Status: Room Air    Ventilator Settings:     / / /     Critical Care IV infusions:   sodium chloride      sodium chloride      sodium chloride 75 mL/hr at 01/24/24 1847    sodium chloride      pantoprazole (PROTONIX) 80 mg in sodium chloride 0.9 % 100 mL infusion 8 mg/hr (01/25/24 0723)    sodium chloride          LDA:   Peripheral IV 01/23/24 Right Antecubital (Active)   Number of days: 1

## 2024-01-25 NOTE — PLAN OF CARE
Problem: Discharge Planning  Goal: Discharge to home or other facility with appropriate resources  Outcome: Progressing     Problem: Pain  Goal: Verbalizes/displays adequate comfort level or baseline comfort level  Outcome: Progressing     Problem: Skin/Tissue Integrity  Goal: Absence of new skin breakdown  Description: 1.  Monitor for areas of redness and/or skin breakdown  2.  Assess vascular access sites hourly  3.  Every 4-6 hours minimum:  Change oxygen saturation probe site  4.  Every 4-6 hours:  If on nasal continuous positive airway pressure, respiratory therapy assess nares and determine need for appliance change or resting period.  Outcome: Progressing     Problem: Safety - Adult  Goal: Free from fall injury  Outcome: Progressing     Problem: ABCDS Injury Assessment  Goal: Absence of physical injury  Outcome: Progressing     Problem: Skin/Tissue Integrity - Adult  Goal: Skin integrity remains intact  Outcome: Progressing  Flowsheets (Taken 1/25/2024 0202)  Skin Integrity Remains Intact:   Monitor for areas of redness and/or skin breakdown   Assess vascular access sites hourly   Every 4-6 hours minimum: Change oxygen saturation probe site   Every 4-6 hours: If on nasal continuous positive airway pressure, respiratory therapy assesses nares and determine need for appliance change or resting period     Problem: Metabolic/Fluid and Electrolytes - Adult  Goal: Electrolytes maintained within normal limits  Outcome: Progressing     Problem: Metabolic/Fluid and Electrolytes - Adult  Goal: Hemodynamic stability and optimal renal function maintained  Outcome: Progressing     Problem: Hematologic - Adult  Goal: Maintains hematologic stability  Outcome: Progressing

## 2024-01-25 NOTE — PROGRESS NOTES
End Of Shift Note  [facility]  Summary of shift: Pt rests quietly in bed most of the shift. Hgb today is 7.6 and 7.7, no signs of bleeding noted. Per GI plan is for 48hrs of protonix drip and then eval pt for possible dc home. Pts diet advanced from clears this am to soft and bite sized this pm. Pt states he tolerated dinner well and no pain or cramping. Pt did have a HA today and got tylenol with relief.    Vitals:    Vitals:    01/24/24 1600 01/24/24 1700 01/24/24 1800 01/24/24 1900   BP: 136/67 135/79 (!) 145/68 106/72   Pulse: (!) 102 98 (!) 121 (!) 116   Resp: 23 22 17 17   Temp:   99.3 °F (37.4 °C) 97.3 °F (36.3 °C)   TempSrc:   Oral Oral   SpO2: 97% 99% 99% 98%   Weight:       Height:            I&O:   Intake/Output Summary (Last 24 hours) at 1/24/2024 1937  Last data filed at 1/24/2024 1900  Gross per 24 hour   Intake 4398.5 ml   Output 2600 ml   Net 1798.5 ml       Resp Status: RA      Critical Care IV infusions:   sodium chloride      sodium chloride      sodium chloride 75 mL/hr at 01/24/24 1847    sodium chloride      pantoprazole (PROTONIX) 80 mg in sodium chloride 0.9 % 100 mL infusion 8 mg/hr (01/24/24 1847)    sodium chloride          LDA:   Peripheral IV 01/23/24 Right Antecubital (Active)   Number of days: 1

## 2024-01-25 NOTE — PROGRESS NOTES
--   --   --   --      --   --   --  140  --   --   --   --    K 3.7  --   --  3.3* 3.6*  --   --   --   --      --   --   --  109*  --   --   --   --    CO2 22  --   --   --  21  --   --   --   --    BUN 20  --   --   --  8  --   --   --   --    CREATININE 0.8  --   --   --  0.7  --   --   --   --    GLUCOSE 181*  --   --   --  133*  --   --   --   --    CALCIUM 8.0*  --   --   --  7.4*  --   --   --   --    PROT 5.5*  --   --   --   --   --   --   --   --    LABALBU 3.5  --   --   --   --   --   --   --   --    AST 15  --   --   --   --   --   --   --   --    ALT 19  --   --   --   --   --   --   --   --    ALKPHOS 40  --   --   --   --   --   --   --   --    BILITOT 0.1*  --   --   --   --   --   --   --   --    MG 1.6  --   --  1.7  --   --   --   --   --     < > = values in this interval not displayed.     Recent Labs     01/23/24  1017   INR 1.2   PROTIME 14.9*         Impression:    Duodenal Mass   Acute Anemia from acute GI blood loss     23-year-old male seen in the ED yesterday for acute GI blood loss with severe anemia and melena. Suspicion for PUD was high in the setting of excess NSAID use but EGD showed Distal duodenal mass like lesion (around 2 cm) with active bleeding associated with it. A pediatric scope was needed to access the lesion.Mass was atypica, oozing blood,l and not classic for submucosal or malignancy.  Hemostasis achieved using epi injection, Bipolar cautery, and 2 hemoclips.Patient's mother informed us that he has a history of Neurofibromatosis which may be the underlying cause of lesion. Plan is to continue supportive care, PPI, and routine H/H with repeat EGD outpatient with biopsies to further evaluate duodenal mass. If hemoglobin remains stable and patient continues with no overt signs of GI blood loss, no objections to discharge home today with Protonix 40 mg BID.     PLAN:    Protonix 40 mg BID  Okay to D/C Protonix drip  Await path from esophagus and stomach  Repeat  EGD for small bowel biopsy in 4 weeks    Discussed with Dr. Cleveland      Electronically signed by XANDER Gonzales CNP on 1/25/2024 at 6:17 AM

## 2024-01-25 NOTE — PROGRESS NOTES
[facility]  Office: [phone]  Sunday Bello DO, Miguel A Quinn DO, Rodolfo Parr DO, Colton Yepez DO, Earl Hu MD, Aye Saavedra MD, Silverio Oshea MD, Leydi Barnett MD,  Albino Landis MD, Maribell Emery MD, Ankit Corral MD,  Jessica Peoples DO, Patria Echevarria MD, Renzo Nobles MD, Js Bello DO, Joselyn Richards MD,  Perfecto Younger DO, Maya Sorensen MD, Sierra Peck MD, Jazz Burris MD, Karthikeyan Guardado MD,  Bubba Ferrera MD, Mary Chapa MD, Jossie Landers MD, Sal Celestin MD, Gonzales Bravo MD, Kaykay Barakat MD, Roberto Tobin DO, Louie Polanco DO, Linda Sarkar MD,  Malik Jeffery MD, Shirley Waterhouse, CNP,  Mami Guillen CNP, Mike Collins, CNP,  Monae Anderson, PIERCE, Belia Au, CNP, Tanya Wilson, CNP, Pamela Fulton CNP, Ml Lee CNP, Carol Saab, CNP, Hanane Wild, PA-C, Dora Arauz PA-C, Taryn Valenzuela, CNP, Shaye Verde, CNS, Nancy Lagos, CNP, Iman Phoenix CNP, Tracy Schwab, CNP         [facility]   IN-PATIENT SERVICE   [facility]    Progress Note    1/25/2024    9:55 AM    Name:   Nicholas Martinez  MRN:     [id number]     Acct:      [id number]   Room:   [address] [place] [address] Day:  2  Admit Date:  1/23/2024  9:57 AM    PCP:   No primary care provider on file.  Code Status:  Full Code    Subjective:     C/C:   Chief Complaint   Patient presents with    Dizziness     Seen on 1/21 for same    Sweats     Interval History Status: improved.   Patient remained in the hospital overnight hide GI requested continue Protonix drip.  This morning patient is stable.  Hemoglobin stable.  No epigastric discomfort.  Diet advancement is gone well.  Stable for discharge.  Brief History:     1/23 - Patient was seen 48 hours ago for near syncope.  Was found to be mildly anemic and discharged home with recommendations for outpatient follow-up.  Today the patient was near syncope again but this time had a very large black tarry
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
## 2024-01-25 NOTE — PROGRESS NOTES
Patients belongings all collected. VSS. Went over discharge paper work with patient and mom no further questions.

## 2024-02-14 ENCOUNTER — ANESTHESIA EVENT (OUTPATIENT)
Dept: OPERATING ROOM | Age: 24
End: 2024-02-14
Payer: COMMERCIAL

## 2024-02-14 ENCOUNTER — ANESTHESIA (OUTPATIENT)
Dept: OPERATING ROOM | Age: 24
End: 2024-02-14
Payer: COMMERCIAL

## 2024-02-14 ENCOUNTER — HOSPITAL ENCOUNTER (OUTPATIENT)
Age: 24
Setting detail: OUTPATIENT SURGERY
Discharge: HOME | End: 2024-02-14
Attending: INTERNAL MEDICINE | Admitting: INTERNAL MEDICINE
Payer: COMMERCIAL

## 2024-02-14 VITALS
WEIGHT: 209.88 LBS | OXYGEN SATURATION: 98 % | TEMPERATURE: 97.5 F | RESPIRATION RATE: 18 BRPM | DIASTOLIC BLOOD PRESSURE: 76 MMHG | BODY MASS INDEX: 32.94 KG/M2 | SYSTOLIC BLOOD PRESSURE: 135 MMHG | HEIGHT: 67 IN | HEART RATE: 89 BPM

## 2024-02-14 DIAGNOSIS — K63.89 SMALL BOWEL MASS: ICD-10-CM

## 2024-02-14 PROCEDURE — 2500000003 HC RX 250 WO HCPCS: Performed by: SPECIALIST

## 2024-02-14 PROCEDURE — 3609014000 HC ENTEROSCOPY BIOPSY: Performed by: INTERNAL MEDICINE

## 2024-02-14 PROCEDURE — 2580000003 HC RX 258: Performed by: SPECIALIST

## 2024-02-14 PROCEDURE — 6360000002 HC RX W HCPCS: Performed by: SPECIALIST

## 2024-02-14 PROCEDURE — 2720000010 HC SURG SUPPLY STERILE: Performed by: INTERNAL MEDICINE

## 2024-02-14 PROCEDURE — 3700000001 HC ADD 15 MINUTES (ANESTHESIA): Performed by: INTERNAL MEDICINE

## 2024-02-14 PROCEDURE — 88305 TISSUE EXAM BY PATHOLOGIST: CPT

## 2024-02-14 PROCEDURE — 7100000010 HC PHASE II RECOVERY - FIRST 15 MIN: Performed by: INTERNAL MEDICINE

## 2024-02-14 PROCEDURE — 3700000000 HC ANESTHESIA ATTENDED CARE: Performed by: INTERNAL MEDICINE

## 2024-02-14 PROCEDURE — 2580000003 HC RX 258: Performed by: ANESTHESIOLOGY

## 2024-02-14 PROCEDURE — 7100000011 HC PHASE II RECOVERY - ADDTL 15 MIN: Performed by: INTERNAL MEDICINE

## 2024-02-14 PROCEDURE — C1889 IMPLANT/INSERT DEVICE, NOC: HCPCS | Performed by: INTERNAL MEDICINE

## 2024-02-14 PROCEDURE — 2709999900 HC NON-CHARGEABLE SUPPLY: Performed by: INTERNAL MEDICINE

## 2024-02-14 PROCEDURE — 6360000002 HC RX W HCPCS: Performed by: INTERNAL MEDICINE

## 2024-02-14 DEVICE — CLIP
Type: IMPLANTABLE DEVICE | Site: ESOPHAGUS | Status: FUNCTIONAL
Brand: RESOLUTION 360™ ULTRA CLIP

## 2024-02-14 RX ORDER — LIDOCAINE HYDROCHLORIDE 20 MG/ML
INJECTION, SOLUTION INFILTRATION; PERINEURAL PRN
Status: DISCONTINUED | OUTPATIENT
Start: 2024-02-14 | End: 2024-02-14 | Stop reason: SDUPTHER

## 2024-02-14 RX ORDER — SODIUM CHLORIDE, SODIUM LACTATE, POTASSIUM CHLORIDE, CALCIUM CHLORIDE 600; 310; 30; 20 MG/100ML; MG/100ML; MG/100ML; MG/100ML
INJECTION, SOLUTION INTRAVENOUS CONTINUOUS PRN
Status: DISCONTINUED | OUTPATIENT
Start: 2024-02-14 | End: 2024-02-14 | Stop reason: SDUPTHER

## 2024-02-14 RX ORDER — SODIUM CHLORIDE, SODIUM LACTATE, POTASSIUM CHLORIDE, CALCIUM CHLORIDE 600; 310; 30; 20 MG/100ML; MG/100ML; MG/100ML; MG/100ML
INJECTION, SOLUTION INTRAVENOUS CONTINUOUS
Status: DISCONTINUED | OUTPATIENT
Start: 2024-02-14 | End: 2024-02-14 | Stop reason: HOSPADM

## 2024-02-14 RX ORDER — PROPOFOL 10 MG/ML
INJECTION, EMULSION INTRAVENOUS PRN
Status: DISCONTINUED | OUTPATIENT
Start: 2024-02-14 | End: 2024-02-14 | Stop reason: SDUPTHER

## 2024-02-14 RX ORDER — EPINEPHRINE 1 MG/ML
INJECTION, SOLUTION INTRAMUSCULAR; SUBCUTANEOUS PRN
Status: DISCONTINUED | OUTPATIENT
Start: 2024-02-14 | End: 2024-02-14 | Stop reason: ALTCHOICE

## 2024-02-14 RX ADMIN — SODIUM CHLORIDE, SODIUM LACTATE, POTASSIUM CHLORIDE, CALCIUM CHLORIDE: 600; 310; 30; 20 INJECTION, SOLUTION INTRAVENOUS at 09:03

## 2024-02-14 RX ADMIN — PROPOFOL 120 MG: 10 INJECTION, EMULSION INTRAVENOUS at 09:05

## 2024-02-14 RX ADMIN — SODIUM CHLORIDE, POTASSIUM CHLORIDE, SODIUM LACTATE AND CALCIUM CHLORIDE: 600; 310; 30; 20 INJECTION, SOLUTION INTRAVENOUS at 08:10

## 2024-02-14 RX ADMIN — PROPOFOL 30 MG: 10 INJECTION, EMULSION INTRAVENOUS at 09:14

## 2024-02-14 RX ADMIN — PROPOFOL 30 MG: 10 INJECTION, EMULSION INTRAVENOUS at 09:07

## 2024-02-14 RX ADMIN — PROPOFOL 20 MG: 10 INJECTION, EMULSION INTRAVENOUS at 09:13

## 2024-02-14 RX ADMIN — PROPOFOL 50 MG: 10 INJECTION, EMULSION INTRAVENOUS at 09:08

## 2024-02-14 RX ADMIN — PROPOFOL 20 MG: 10 INJECTION, EMULSION INTRAVENOUS at 09:12

## 2024-02-14 RX ADMIN — PROPOFOL 50 MG: 10 INJECTION, EMULSION INTRAVENOUS at 09:10

## 2024-02-14 RX ADMIN — PROPOFOL 60 MG: 10 INJECTION, EMULSION INTRAVENOUS at 09:19

## 2024-02-14 RX ADMIN — SODIUM CHLORIDE, POTASSIUM CHLORIDE, SODIUM LACTATE AND CALCIUM CHLORIDE: 600; 310; 30; 20 INJECTION, SOLUTION INTRAVENOUS at 08:13

## 2024-02-14 RX ADMIN — LIDOCAINE HYDROCHLORIDE 60 MG: 20 INJECTION, SOLUTION INFILTRATION; PERINEURAL at 09:05

## 2024-02-14 ASSESSMENT — PAIN - FUNCTIONAL ASSESSMENT
PAIN_FUNCTIONAL_ASSESSMENT: FACE, LEGS, ACTIVITY, CRY, AND CONSOLABILITY (FLACC)
PAIN_FUNCTIONAL_ASSESSMENT: 0-10

## 2024-02-14 NOTE — ANESTHESIA PRE PROCEDURE
Department of Anesthesiology  Preprocedure Note       Name:  Nicholas Martinez   Age:  24 y.o.  :  2000                                          MRN:  [id number]         Date:  2024      Surgeon: Surgeon(s):  Josiah Del Toro MD    Procedure: Procedure(s):  EGD  ENTEROSCOPY PUSH DIAGNOSTIC FORCEP BIOPSY, EPINEPHRINE INJECTION, CAUTERY, CLIPPED    Medications prior to admission:   Prior to Admission medications    Medication Sig Start Date End Date Taking? Authorizing Provider   pantoprazole (PROTONIX) 40 MG tablet Take 1 tablet by mouth in the morning and at bedtime 24   Mike Collins, APRN - NP       Current medications:    Current Facility-Administered Medications   Medication Dose Route Frequency Provider Last Rate Last Admin    lactated ringers IV soln infusion   IntraVENous Continuous Rico Prieto  mL/hr at 24 0813 New Bag at 24 08    lidocaine 1% (buffered) injection 0.5 mL  0.5 mL SubCUTAneous Once Rico Prieto MD           Allergies:  No Known Allergies    Problem List:    Patient Active Problem List   Diagnosis Code    Acute GI bleeding K92.2       Past Medical History:        Diagnosis Date    Acid reflux     Anemia     Asperger syndrome     Asthma     As a child, resolved now.    Autism        Past Surgical History:        Procedure Laterality Date    UPPER GASTROINTESTINAL ENDOSCOPY N/A 2024    EGD BIOPSY performed by Josiah Del Toro MD at [facility] OR       Social History:    Social History     Tobacco Use    Smoking status: Never    Smokeless tobacco: Never   Substance Use Topics    Alcohol use: Never                                Counseling given: Not Answered      Vital Signs (Current):   Vitals:    24 0748 24 0755 24 0932 24 0945   BP:   138/72 130/65   Pulse:  85 83 70   Resp:   27 23   Temp:   97.7 °F (36.5 °C)    TempSrc:   Temporal    SpO2:  98% 98% 97%   Weight: 95.2 kg (209 lb 14.1 oz)      Height: 1.702 m (5' 7\")

## 2024-02-14 NOTE — DISCHARGE INSTRUCTIONS
[facility]    POST-ENDOSCOPY INSTRUCTIONS    1. ACTIVITY   No driving, operating machinery, or making important decisions for 24 hours.    Resume normal activity after 24 hours.  You may return to work after 24 hours.    2. DIET        Resume your usual diet unless specified below.       3. MEDICATIONS    Resume your usual medications.     Do not consume alcohol, tranquilizers, or sleeping medications for 24 hour unless advised by your physician.                 4. PHYSICIAN FOLLOW-UP / INSTRUCTIONS    Please call the office/clinic in 10 days for biopsy results:    GI office:  [phone].  Call for office appt.          Follow up with your family physician as planned.    6. NORMAL CHANGES YOU MAY EXPERIENCE AFTER ENDOSCOPY:         EGD/ERCP     COLONOSCOPY     Sore throat after EGD/ERCP  Passing of gas for several hours.     A bloated feeling and belching from  Some mild abdominal cramping.     air in stomach               You may feel fatigued for the next 24-48    hours due to the prep and sedation    7. CALL YOUR PHYSICIAN IF YOU EXPERIENCE ANY OF THE FOLLOWING      A.  Passing blood rectally or vomiting blood (color may be red or black)      B.  Severe abdominal pain or tenderness (that is not relieved by passing air)      C.  Fever, chills, or excessive sweating      D.  Persistent nausea or vomiting      E.  Redness or swelling at the IV site

## 2024-02-14 NOTE — OP NOTE
EGD NOTE / SMALL BOWEL ENTEROSCOPY      Patient:   Nicholas Martinez    :    2000    Facility:   [facility]  Referring/PCP: No primary care provider on file.    Procedure:   Esophagogastroduodenoscopy with push enteroscopy  Date:     2024   Endoscopist:  Josiah Del Toro D.O.  Assistant:                  None    Preoperative Diagnosis:     Small bowel mass    Postoperative Diagnosis:    Esophageal lesions  Duodenal mass    Anesthesia:  MAC    Complications: None    Description of Procedure:  Informed consent was obtained after explanation of the procedure including indications, description of the procedure,  benefits and possible risks and complications of the procedure, and alternatives. Questions were answered.  The patient's history was reviewed and a directed physical examination was performed prior to the procedure.    Patient was monitored throughout the procedure with pulse oximetry and periodic assessment of vital signs. Patient was sedated as noted above. With the patient in the left lateral decubitus position, the Olympus videoendoscope was placed in the patient's mouth and under direct visualization passed into the esophagus.  Visualization of the esophagus, stomach, and duodenum was performed during both introduction and withdrawal of the endoscope and retroflexed view of the proximal stomach was obtained. The scope was passed to the 2nd portion of the duodenum.  The patient tolerated the procedure well and was taken to the recovery area in good condition.    EBL: none  Specimen:  duodenal mass  Implants: 2 hemoclips      Findings::   Esophagus: Nodularity at GE jxn.  Previously biopsied (benign).   Stomach: normal  Duodenum: Distal duodenal ulcerated flat mass around 1.5 cm in size (suspect ulceration from prior cautery).  Center of lesion friable appearing.  Careful biopsies obtained.  Bleeding resulted.  Hemostasis achieved with epi injection, bipolar cautery and 2  hemoclips.    Recommendations:   -Await pathology.  -Surgery consultation as mass may cause recurrent bleeding.  -F/U in our office.      Electronically signed by Josiah Del Toro MD, D.O. on 2/14/2024 at 9:00 AM

## 2024-02-14 NOTE — ANESTHESIA POSTPROCEDURE EVALUATION
Department of Anesthesiology  Postprocedure Note    Patient: Nicholas Martinez  MRN: [id number]  YOB: 2000  Date of evaluation: 2/14/2024    Procedure Summary       Date: 02/14/24 Room / Location: [address]    Anesthesia Start: 0903 Anesthesia Stop: 0938    Procedure: EGD  ENTEROSCOPY PUSH DIAGNOSTIC FORCEP BIOPSY, EPINEPHRINE INJECTION, CAUTERY, CLIPPED (Esophagus) Diagnosis:       Small bowel mass      (Small bowel mass [K63.89])    Surgeons: Josiah Del Toro MD Responsible Provider: Zhao Remy DO    Anesthesia Type: MAC, general ASA Status: 3            Anesthesia Type: No value filed.    Rush Phase I:      Rush Phase II: Rush Score: 10    Anesthesia Post Evaluation    Patient location during evaluation: PACU  Patient participation: complete - patient participated  Level of consciousness: awake and alert  Airway patency: patent  Nausea & Vomiting: no nausea and no vomiting  Cardiovascular status: hemodynamically stable  Respiratory status: acceptable  Hydration status: stable  Pain management: adequate    No notable events documented.

## 2024-02-14 NOTE — INTERVAL H&P NOTE
Interval H&P Note    Pt Name: Nicholas Martinez  MRN: [id number]  YOB: 2000  Date of evaluation: 2/14/2024      [x] I have reviewed in [facility] the Admission H&P by Mike Collins CNP dated 1/23/24 for an Interval History and Physical note.     [x] I have examined  Nicholas Martinez, a 24 y.o. male with known Asperger syndrome, autism and anemia who arrived for his scheduled EGD  ENTEROSCOPY PUSH DIAGNOSTIC by Josiah Del Toro MD for Small bowel mass. Patient was hospitalized 1/23-1/25/24 for GI bleed with near syncope (refer to hospital records.) GI consultations with Dr Del Toro and EGD done 1/23/24 with results below.  Patient discharged when stabilized The patient denies new health changes, fever, chills, wheezing, cough, increased SOB, chest pain, open sores or wounds.    Vital signs: BP (!) 141/56   Temp 97.2 °F (36.2 °C)   Resp 16   Ht 1.702 m (5' 7\")   Wt 95.2 kg (209 lb 14.1 oz)   BMI 32.87 kg/m²     Allergies:  Patient has no known allergies.    Medications:    Prior to Admission medications    Medication Sig Start Date End Date Taking? Authorizing Provider   pantoprazole (PROTONIX) 40 MG tablet Take 1 tablet by mouth in the morning and at bedtime 1/25/24   Mike Collins, APRN - NP         This is a 24 y.o. male who is pleasant, cooperative, alert and oriented x3, in no acute distress.    Heart: Heart sounds are normal.  HR 76 regular rate and rhythm without murmur, gallop or rub.   Lungs: Normal respiratory effort with equal expansion, good air exchange, unlabored and clear to auscultation without wheezes or rales bilaterally   Abdomen: soft, nontender, nondistended with bowel sounds .       Labs:  Recent Labs     01/25/24  0419 01/24/24  1149 01/24/24  0339 01/23/24  1103 01/23/24  1017   HGB 7.2*   < > 7.5*   < >  --    HCT 22.0*   < > 23.2*   < >  --    WBC  --   --  16.3*   < >  --    MCV  --   --  92.4   < >  --    PLT  --   --  243   < >  --    NA  --   --  140  --  139   K  --   --  3.6*   <  140  --  139   K  --   --  3.6*   < > 3.7   CL  --   --  109*  --  105   CO2  --   --  21  --  22   BUN  --   --  8  --  20   CREATININE  --   --  0.7  --  0.8   GLUCOSE  --   --  133*  --  181*   INR  --   --   --   --  1.2   PROTIME  --   --   --   --  14.9*   AST  --   --   --   --  15   ALT  --   --   --   --  19   LABALBU  --   --   --   --  3.5    < > = values in this interval not displayed.       No results for input(s): \"COVID19\" in the last 720 hours.    Aislinn Elizondo APRN - CNP  Electronically signed 2/14/2024 at 7:54 AM

## 2024-02-17 LAB — SURGICAL PATHOLOGY REPORT: NORMAL

## 2024-02-26 ENCOUNTER — HOSPITAL ENCOUNTER (OUTPATIENT)
Age: 24
Setting detail: SPECIMEN
Discharge: HOME | End: 2024-02-26

## 2024-02-26 ENCOUNTER — OFFICE VISIT (OUTPATIENT)
Dept: FAMILY MEDICINE CLINIC | Age: 24
End: 2024-02-26
Payer: COMMERCIAL

## 2024-02-26 VITALS
WEIGHT: 206 LBS | OXYGEN SATURATION: 99 % | SYSTOLIC BLOOD PRESSURE: 132 MMHG | RESPIRATION RATE: 18 BRPM | BODY MASS INDEX: 32.33 KG/M2 | DIASTOLIC BLOOD PRESSURE: 78 MMHG | HEIGHT: 67 IN | HEART RATE: 76 BPM

## 2024-02-26 DIAGNOSIS — E66.9 CLASS 1 OBESITY WITHOUT SERIOUS COMORBIDITY WITH BODY MASS INDEX (BMI) OF 32.0 TO 32.9 IN ADULT, UNSPECIFIED OBESITY TYPE: ICD-10-CM

## 2024-02-26 DIAGNOSIS — R73.01 ELEVATED FASTING GLUCOSE: ICD-10-CM

## 2024-02-26 DIAGNOSIS — Z11.59 ENCOUNTER FOR HEPATITIS C SCREENING TEST FOR LOW RISK PATIENT: ICD-10-CM

## 2024-02-26 DIAGNOSIS — D64.9 ANEMIA, UNSPECIFIED TYPE: ICD-10-CM

## 2024-02-26 DIAGNOSIS — Z76.89 ENCOUNTER TO ESTABLISH CARE: Primary | ICD-10-CM

## 2024-02-26 DIAGNOSIS — Z11.4 ENCOUNTER FOR SCREENING FOR HIV: ICD-10-CM

## 2024-02-26 DIAGNOSIS — K63.89 SMALL BOWEL MASS: ICD-10-CM

## 2024-02-26 LAB
ALBUMIN SERPL-MCNC: 5 G/DL (ref 3.5–5.2)
ALBUMIN/GLOB SERPL: 2 {RATIO} (ref 1–2.5)
ALP SERPL-CCNC: 79 U/L (ref 40–129)
ALT SERPL-CCNC: 18 U/L (ref 10–50)
ANION GAP SERPL CALCULATED.3IONS-SCNC: 12 MMOL/L (ref 9–16)
AST SERPL-CCNC: 16 U/L (ref 10–50)
BASOPHILS # BLD: 0.08 K/UL (ref 0–0.2)
BASOPHILS NFR BLD: 2 % (ref 0–2)
BILIRUB SERPL-MCNC: 0.3 MG/DL (ref 0–1.2)
BUN SERPL-MCNC: 11 MG/DL (ref 6–20)
CALCIUM SERPL-MCNC: 9.6 MG/DL (ref 8.6–10.4)
CHLORIDE SERPL-SCNC: 104 MMOL/L (ref 98–107)
CHOLEST SERPL-MCNC: 172 MG/DL (ref 0–199)
CHOLESTEROL/HDL RATIO: 7
CO2 SERPL-SCNC: 24 MMOL/L (ref 20–31)
CREAT SERPL-MCNC: 0.8 MG/DL (ref 0.7–1.2)
EOSINOPHIL # BLD: 0.17 K/UL (ref 0–0.44)
EOSINOPHILS RELATIVE PERCENT: 3 % (ref 1–4)
ERYTHROCYTE [DISTWIDTH] IN BLOOD BY AUTOMATED COUNT: 12.8 % (ref 11.8–14.4)
EST. AVERAGE GLUCOSE BLD GHB EST-MCNC: 54 MG/DL
GFR SERPL CREATININE-BSD FRML MDRD: >60 ML/MIN/1.73M2
GLUCOSE SERPL-MCNC: 89 MG/DL (ref 74–99)
HBA1C MFR BLD: 3.5 % (ref 4–6)
HCT VFR BLD AUTO: 37.6 % (ref 40.7–50.3)
HCV AB SERPL QL IA: NONREACTIVE
HDLC SERPL-MCNC: 26 MG/DL
HGB BLD-MCNC: 11.7 G/DL (ref 13–17)
HIV 1+2 AB+HIV1 P24 AG SERPL QL IA: NONREACTIVE
IMM GRANULOCYTES # BLD AUTO: 0.06 K/UL (ref 0–0.3)
IMM GRANULOCYTES NFR BLD: 1 %
LDLC SERPL CALC-MCNC: 128 MG/DL (ref 0–100)
LYMPHOCYTES NFR BLD: 1.32 K/UL (ref 1.1–3.7)
LYMPHOCYTES RELATIVE PERCENT: 25 % (ref 24–43)
MCH RBC QN AUTO: 25.8 PG (ref 25.2–33.5)
MCHC RBC AUTO-ENTMCNC: 31.1 G/DL (ref 28.4–34.8)
MCV RBC AUTO: 82.8 FL (ref 82.6–102.9)
MONOCYTES NFR BLD: 0.32 K/UL (ref 0.1–1.2)
MONOCYTES NFR BLD: 6 % (ref 3–12)
NEUTROPHILS NFR BLD: 63 % (ref 36–65)
NEUTS SEG NFR BLD: 3.25 K/UL (ref 1.5–8.1)
NRBC BLD-RTO: 0 PER 100 WBC
PLATELET # BLD AUTO: 285 K/UL (ref 138–453)
PMV BLD AUTO: 10.7 FL (ref 8.1–13.5)
POTASSIUM SERPL-SCNC: 4 MMOL/L (ref 3.7–5.3)
PROT SERPL-MCNC: 7.5 G/DL (ref 6.6–8.7)
RBC # BLD AUTO: 4.54 M/UL (ref 4.21–5.77)
SODIUM SERPL-SCNC: 140 MMOL/L (ref 136–145)
TRIGL SERPL-MCNC: 87 MG/DL
TSH SERPL DL<=0.05 MIU/L-ACNC: 0.31 UIU/ML (ref 0.27–4.2)
VLDLC SERPL CALC-MCNC: 17 MG/DL
WBC OTHER # BLD: 5.2 K/UL (ref 3.5–11.3)

## 2024-02-26 PROCEDURE — 1036F TOBACCO NON-USER: CPT | Performed by: NURSE PRACTITIONER

## 2024-02-26 PROCEDURE — 99204 OFFICE O/P NEW MOD 45 MIN: CPT | Performed by: NURSE PRACTITIONER

## 2024-02-26 PROCEDURE — G8484 FLU IMMUNIZE NO ADMIN: HCPCS | Performed by: NURSE PRACTITIONER

## 2024-02-26 PROCEDURE — G8427 DOCREV CUR MEDS BY ELIG CLIN: HCPCS | Performed by: NURSE PRACTITIONER

## 2024-02-26 PROCEDURE — G8417 CALC BMI ABV UP PARAM F/U: HCPCS | Performed by: NURSE PRACTITIONER

## 2024-02-26 SDOH — ECONOMIC STABILITY: HOUSING INSECURITY
IN THE LAST 12 MONTHS, WAS THERE A TIME WHEN YOU DID NOT HAVE A STEADY PLACE TO SLEEP OR SLEPT IN A SHELTER (INCLUDING NOW)?: NO

## 2024-02-26 SDOH — ECONOMIC STABILITY: FOOD INSECURITY: WITHIN THE PAST 12 MONTHS, THE FOOD YOU BOUGHT JUST DIDN'T LAST AND YOU DIDN'T HAVE MONEY TO GET MORE.: NEVER TRUE

## 2024-02-26 SDOH — ECONOMIC STABILITY: INCOME INSECURITY: HOW HARD IS IT FOR YOU TO PAY FOR THE VERY BASICS LIKE FOOD, HOUSING, MEDICAL CARE, AND HEATING?: NOT HARD AT ALL

## 2024-02-26 SDOH — ECONOMIC STABILITY: FOOD INSECURITY: WITHIN THE PAST 12 MONTHS, YOU WORRIED THAT YOUR FOOD WOULD RUN OUT BEFORE YOU GOT MONEY TO BUY MORE.: NEVER TRUE

## 2024-02-26 ASSESSMENT — ENCOUNTER SYMPTOMS
SHORTNESS OF BREATH: 0
NAUSEA: 0
SINUS PAIN: 0
CONSTIPATION: 0
BACK PAIN: 0
DIARRHEA: 0
CHEST TIGHTNESS: 0
ABDOMINAL PAIN: 0
VOMITING: 0
COLOR CHANGE: 0
EYE PAIN: 0
SINUS PRESSURE: 0

## 2024-02-26 ASSESSMENT — PATIENT HEALTH QUESTIONNAIRE - PHQ9
SUM OF ALL RESPONSES TO PHQ QUESTIONS 1-9: 0
SUM OF ALL RESPONSES TO PHQ QUESTIONS 1-9: 0
2. FEELING DOWN, DEPRESSED OR HOPELESS: 0
1. LITTLE INTEREST OR PLEASURE IN DOING THINGS: 0
SUM OF ALL RESPONSES TO PHQ QUESTIONS 1-9: 0
SUM OF ALL RESPONSES TO PHQ QUESTIONS 1-9: 0
SUM OF ALL RESPONSES TO PHQ9 QUESTIONS 1 & 2: 0

## 2024-02-26 NOTE — PROGRESS NOTES
Nicholas Martinez (:  2000) is a 24 y.o. male,New patient, here for evaluation of the following chief complaint(s):  New Patient (Unknown last PCP./), Health Maintenance (Depression screen completed. /SDOH completed. /), and ED Follow-up (EGD follow up. /)       ASSESSMENT/PLAN:  1. Encounter to establish care  2. Elevated fasting glucose  -     Comprehensive Metabolic Panel; Future  -     Hemoglobin A1C; Future  3. Encounter for screening for HIV  -     HIV Screen; Future  4. Encounter for hepatitis C screening test for low risk patient  -     Hepatitis C Antibody; Future  5. Class 1 obesity without serious comorbidity with body mass index (BMI) of 32.0 to 32.9 in adult, unspecified obesity type  -     Lipid Panel; Future  -     TSH With Reflex Ft4; Future  6. Anemia, unspecified type  -     CBC with Auto Differential; Future  7. Small bowel mass  Comments:  f/u as scheduled with GI  continue protonix 40mg daily      MDM  Reviewed: previous chart, nursing note and vitals  Reviewed previous: CT scan          Return in about 6 months (around 2024) for 6 month follow up.         Subjective   SUBJECTIVE/OBJECTIVE:  Presents to establish care as new patient and hospital follow up  Has not seen pcp since he was a child   Was driven to appointment by family friend - neither him nor mom have drivers license  He lives with mom  Hx of autism, aspergers syndrome   On disability, does not work     History of childhood asthma  Has not needed inhaler in more than several years     [facility] admission 2024 - 2024  Hospital Course:  \"Nicholas Martinez is a 23 y.o. male who was admitted for the management of  Acute GI bleeding, presented to ER with Dizziness (Seen on  for same) and Sweats   - Patient was seen 48 hours ago for near syncope.  Was found to be mildly anemic and discharged home with recommendations for outpatient follow-up.  Today the patient was near syncope again but this

## 2024-08-26 ENCOUNTER — OFFICE VISIT (OUTPATIENT)
Dept: FAMILY MEDICINE CLINIC | Age: 24
End: 2024-08-26
Payer: COMMERCIAL

## 2024-08-26 ENCOUNTER — HOSPITAL ENCOUNTER (OUTPATIENT)
Age: 24
Setting detail: SPECIMEN
Discharge: HOME | End: 2024-08-26

## 2024-08-26 VITALS
HEART RATE: 103 BPM | WEIGHT: 208 LBS | SYSTOLIC BLOOD PRESSURE: 134 MMHG | DIASTOLIC BLOOD PRESSURE: 76 MMHG | BODY MASS INDEX: 32.57 KG/M2 | OXYGEN SATURATION: 97 %

## 2024-08-26 DIAGNOSIS — D64.9 ANEMIA, UNSPECIFIED TYPE: Primary | ICD-10-CM

## 2024-08-26 DIAGNOSIS — K92.2 UPPER GI BLEED: ICD-10-CM

## 2024-08-26 DIAGNOSIS — D64.9 ANEMIA, UNSPECIFIED TYPE: ICD-10-CM

## 2024-08-26 DIAGNOSIS — Z23 NEED FOR INFLUENZA VACCINATION: ICD-10-CM

## 2024-08-26 LAB
BASOPHILS # BLD: 0.06 K/UL (ref 0–0.2)
BASOPHILS NFR BLD: 1 % (ref 0–2)
EOSINOPHIL # BLD: 0.15 K/UL (ref 0–0.44)
EOSINOPHILS RELATIVE PERCENT: 2 % (ref 1–4)
ERYTHROCYTE [DISTWIDTH] IN BLOOD BY AUTOMATED COUNT: 14.1 % (ref 11.8–14.4)
HCT VFR BLD AUTO: 41 % (ref 40.7–50.3)
HGB BLD-MCNC: 12.5 G/DL (ref 13–17)
IMM GRANULOCYTES # BLD AUTO: <0.03 K/UL (ref 0–0.3)
IMM GRANULOCYTES NFR BLD: 0 %
LYMPHOCYTES NFR BLD: 1.6 K/UL (ref 1.1–3.7)
LYMPHOCYTES RELATIVE PERCENT: 24 % (ref 24–43)
MCH RBC QN AUTO: 22.4 PG (ref 25.2–33.5)
MCHC RBC AUTO-ENTMCNC: 30.5 G/DL (ref 28.4–34.8)
MCV RBC AUTO: 73.3 FL (ref 82.6–102.9)
MONOCYTES NFR BLD: 0.51 K/UL (ref 0.1–1.2)
MONOCYTES NFR BLD: 8 % (ref 3–12)
NEUTROPHILS NFR BLD: 65 % (ref 36–65)
NEUTS SEG NFR BLD: 4.44 K/UL (ref 1.5–8.1)
NRBC BLD-RTO: 0 PER 100 WBC
PLATELET # BLD AUTO: 242 K/UL (ref 138–453)
PMV BLD AUTO: 11.1 FL (ref 8.1–13.5)
RBC # BLD AUTO: 5.59 M/UL (ref 4.21–5.77)
RBC # BLD: ABNORMAL 10*6/UL
WBC OTHER # BLD: 6.8 K/UL (ref 3.5–11.3)

## 2024-08-26 PROCEDURE — 99213 OFFICE O/P EST LOW 20 MIN: CPT | Performed by: NURSE PRACTITIONER

## 2024-08-26 PROCEDURE — 90661 CCIIV3 VAC ABX FR 0.5 ML IM: CPT | Performed by: NURSE PRACTITIONER

## 2024-08-26 PROCEDURE — G8417 CALC BMI ABV UP PARAM F/U: HCPCS | Performed by: NURSE PRACTITIONER

## 2024-08-26 PROCEDURE — G8427 DOCREV CUR MEDS BY ELIG CLIN: HCPCS | Performed by: NURSE PRACTITIONER

## 2024-08-26 PROCEDURE — 1036F TOBACCO NON-USER: CPT | Performed by: NURSE PRACTITIONER

## 2024-08-26 ASSESSMENT — ENCOUNTER SYMPTOMS
BACK PAIN: 0
ABDOMINAL PAIN: 0
EYE PAIN: 0
SINUS PRESSURE: 0
COLOR CHANGE: 0
SINUS PAIN: 0
DIARRHEA: 0
CHEST TIGHTNESS: 0
SHORTNESS OF BREATH: 0
VOMITING: 0
CONSTIPATION: 0
NAUSEA: 0

## 2024-08-26 NOTE — PROGRESS NOTES
Nicholas Martinez (:  2000) is a 24 y.o. male,Established patient, here for evaluation of the following chief complaint(s):  Follow-up (From establish care 6 weeks ago), Flu Vaccine, and Gastroesophageal Reflux         Assessment & Plan  Anemia, unspecified type    Rx for cbc - f/u pending results    Orders:    CBC with Auto Differential; Future    Need for influenza vaccination       Orders:    Influenza, FLUCELVAX Trivalent, (age 6 mo+) IM, Preservative Free, 0.5mL    Upper GI bleed    Record release form signed today for Dr. Del Toro's office. He is unsure if he ever followed back up post hospital admission.            Return in about 6 months (around 2025) for physical.       Subjective   Presents for 6 month follow up of chronic conditions    GI: Stopped protonix 2 months ago on his own  Unsure if he ever completed follow up with GI  Denies hematochezia, dizziness/lightheadedness, syncope    Willing to update influenza shot today  Denies any other problems/concerns at this time        Review of Systems   Constitutional:  Negative for activity change, chills, fatigue and unexpected weight change.   HENT:  Negative for hearing loss, postnasal drip, sinus pressure and sinus pain.    Eyes:  Negative for pain and visual disturbance.   Respiratory:  Negative for chest tightness and shortness of breath.    Cardiovascular:  Negative for chest pain and palpitations.   Gastrointestinal:  Negative for abdominal pain, constipation, diarrhea, nausea and vomiting.   Endocrine: Negative for polydipsia, polyphagia and polyuria.   Genitourinary:  Negative for dysuria, flank pain, frequency and urgency.   Musculoskeletal:  Negative for arthralgias, back pain and myalgias.   Skin:  Negative for color change and rash.   Neurological:  Negative for dizziness, weakness, light-headedness, numbness and headaches.   Psychiatric/Behavioral:  Negative for confusion, hallucinations, self-injury, sleep disturbance and

## 2024-08-26 NOTE — ASSESSMENT & PLAN NOTE
Record release form signed today for Dr. Del Toro's office. He is unsure if he ever followed back up post hospital admission.

## 2024-08-30 DIAGNOSIS — D64.9 ANEMIA, UNSPECIFIED TYPE: Primary | ICD-10-CM

## 2024-09-02 NOTE — DISCHARGE SUMMARY
[facility]  Office: [phone]  Sunday Bello DO, Miguel A Quinn DO, Rodolfo Parr DO, Colton Yepez DO, Earl Hu MD, Aye Saavedra MD, Silverio Oshea MD, Leydi Barnett MD,  Albino Landis MD, Maribell Emery MD, Ankit Corral MD,  Jessica Peoples DO, Patria Echevarria MD, Renzo Nobles MD, Js Bello DO, Joselyn Richards MD,  Perfecto Younger DO, Maya Sorensen MD, Sierra Peck MD, Jazz Burris MD, Karthikeyan Guardado MD,  Bubba Ferrera MD, Mary Chapa MD, Jossie Landers MD, Sal Celestin MD, Gonzales Bravo MD, Kaykay Barakat MD, Roberto Tobin DO, Louie Polanco DO, Linda Sarkar MD,  Malik Jeffery MD, Shirley Waterhouse, CNP,  Mami Guillen CNP, Mike Collins, CNP,  Monae Anderson, PIERCE, Belia Au, CNP, Tanya Wilson, CNP, Pamela Fulton CNP, Ml Lee CNP, Craol Saab, CNP, Hanane Wild, PA-C, Dora Arauz PA-C, Taryn Valenzuela, CNP, Shaye Verde, CNS, Nancy Lagos, CNP, Iman Phoenix CNP, Tracy Schwab, CNP         [facility]   IN-PATIENT SERVICE   [facility]    Discharge Summary     Patient ID: Nicholas Martinez  :  2000   MRN: [id number]     ACCOUNT:  [id number]   Patient's PCP: No primary care provider on file.  Admit Date: 2024   Discharge Date: 2024     Length of Stay: 2  Code Status:  Full Code  Admitting Physician: Colton Yepez DO  Discharge Physician: Mike Collins, XANDER - NP     Active Discharge Diagnoses:     Hospital Problem Lists:  Principal Problem:    Acute GI bleeding  Resolved Problems:    * No resolved hospital problems. *      Admission Condition:  fair     Discharged Condition: stable    Hospital Stay:     Hospital Course:  Nicholas Martinez is a 23 y.o. male who was admitted for the management of  Acute GI bleeding , presented to ER with Dizziness (Seen on  for same) and Sweats     - Patient was seen 48 hours ago for near syncope.  Was found to be mildly anemic and discharged 
not possible

## 2025-03-21 ENCOUNTER — APPOINTMENT (OUTPATIENT)
Dept: CT IMAGING | Age: 25
DRG: 253 | End: 2025-03-21
Payer: COMMERCIAL

## 2025-03-21 ENCOUNTER — HOSPITAL ENCOUNTER (INPATIENT)
Age: 25
LOS: 3 days | Discharge: ANOTHER HOSPITAL | DRG: 253 | End: 2025-03-25
Attending: EMERGENCY MEDICINE | Admitting: INTERNAL MEDICINE
Payer: COMMERCIAL

## 2025-03-21 DIAGNOSIS — K92.2 GASTROINTESTINAL HEMORRHAGE, UNSPECIFIED GASTROINTESTINAL HEMORRHAGE TYPE: ICD-10-CM

## 2025-03-21 DIAGNOSIS — D64.9 ANEMIA, UNSPECIFIED TYPE: Primary | ICD-10-CM

## 2025-03-21 LAB
FLUAV RNA RESP QL NAA+PROBE: NOT DETECTED
FLUBV RNA RESP QL NAA+PROBE: NOT DETECTED
SARS-COV-2 RNA RESP QL NAA+PROBE: NOT DETECTED
SOURCE: NORMAL
SPECIMEN DESCRIPTION: NORMAL

## 2025-03-21 PROCEDURE — 87636 SARSCOV2 & INF A&B AMP PRB: CPT

## 2025-03-21 PROCEDURE — 70450 CT HEAD/BRAIN W/O DYE: CPT

## 2025-03-21 PROCEDURE — 6360000002 HC RX W HCPCS: Performed by: NURSE PRACTITIONER

## 2025-03-21 PROCEDURE — 99285 EMERGENCY DEPT VISIT HI MDM: CPT

## 2025-03-21 PROCEDURE — 83735 ASSAY OF MAGNESIUM: CPT

## 2025-03-21 PROCEDURE — 80048 BASIC METABOLIC PNL TOTAL CA: CPT

## 2025-03-21 PROCEDURE — 83550 IRON BINDING TEST: CPT

## 2025-03-21 PROCEDURE — 96375 TX/PRO/DX INJ NEW DRUG ADDON: CPT

## 2025-03-21 PROCEDURE — 96374 THER/PROPH/DIAG INJ IV PUSH: CPT

## 2025-03-21 PROCEDURE — 96361 HYDRATE IV INFUSION ADD-ON: CPT

## 2025-03-21 PROCEDURE — 2580000003 HC RX 258: Performed by: NURSE PRACTITIONER

## 2025-03-21 PROCEDURE — 83540 ASSAY OF IRON: CPT

## 2025-03-21 RX ORDER — 0.9 % SODIUM CHLORIDE 0.9 %
1000 INTRAVENOUS SOLUTION INTRAVENOUS ONCE
Status: COMPLETED | OUTPATIENT
Start: 2025-03-21 | End: 2025-03-22

## 2025-03-21 RX ORDER — DIPHENHYDRAMINE HYDROCHLORIDE 50 MG/ML
25 INJECTION, SOLUTION INTRAMUSCULAR; INTRAVENOUS ONCE
Status: COMPLETED | OUTPATIENT
Start: 2025-03-21 | End: 2025-03-21

## 2025-03-21 RX ORDER — KETOROLAC TROMETHAMINE 15 MG/ML
15 INJECTION, SOLUTION INTRAMUSCULAR; INTRAVENOUS ONCE
Status: COMPLETED | OUTPATIENT
Start: 2025-03-22 | End: 2025-03-22

## 2025-03-21 RX ORDER — DEXAMETHASONE SODIUM PHOSPHATE 10 MG/ML
8 INJECTION, SOLUTION INTRAMUSCULAR; INTRAVENOUS ONCE
Status: COMPLETED | OUTPATIENT
Start: 2025-03-21 | End: 2025-03-21

## 2025-03-21 RX ORDER — METOCLOPRAMIDE HYDROCHLORIDE 5 MG/ML
10 INJECTION INTRAMUSCULAR; INTRAVENOUS ONCE
Status: COMPLETED | OUTPATIENT
Start: 2025-03-21 | End: 2025-03-21

## 2025-03-21 RX ADMIN — SODIUM CHLORIDE 1000 ML: 0.9 INJECTION, SOLUTION INTRAVENOUS at 23:06

## 2025-03-21 RX ADMIN — METOCLOPRAMIDE HYDROCHLORIDE 10 MG: 5 INJECTION INTRAMUSCULAR; INTRAVENOUS at 23:07

## 2025-03-21 RX ADMIN — DEXAMETHASONE SODIUM PHOSPHATE 8 MG: 10 INJECTION, SOLUTION INTRAMUSCULAR; INTRAVENOUS at 23:07

## 2025-03-21 RX ADMIN — DIPHENHYDRAMINE HYDROCHLORIDE 25 MG: 50 INJECTION INTRAMUSCULAR; INTRAVENOUS at 23:06

## 2025-03-21 ASSESSMENT — PAIN SCALES - GENERAL: PAINLEVEL_OUTOF10: 5

## 2025-03-21 ASSESSMENT — PAIN - FUNCTIONAL ASSESSMENT: PAIN_FUNCTIONAL_ASSESSMENT: 0-10

## 2025-03-22 PROBLEM — D62 ANEMIA DUE TO ACUTE BLOOD LOSS: Status: ACTIVE | Noted: 2025-03-22

## 2025-03-22 PROBLEM — K92.2 ACUTE LOWER GI BLEEDING: Status: ACTIVE | Noted: 2025-03-22

## 2025-03-22 PROBLEM — K31.89 DUODENAL MASS: Status: ACTIVE | Noted: 2025-03-22

## 2025-03-22 LAB
ALBUMIN SERPL-MCNC: 4.4 G/DL (ref 3.5–5.2)
ALBUMIN/GLOB SERPL: 2.3 {RATIO} (ref 1–2.5)
ALP SERPL-CCNC: 69 U/L (ref 40–129)
ALT SERPL-CCNC: 14 U/L (ref 10–50)
ANION GAP SERPL CALCULATED.3IONS-SCNC: 13 MMOL/L (ref 9–16)
ANION GAP SERPL CALCULATED.3IONS-SCNC: 14 MMOL/L (ref 9–16)
AST SERPL-CCNC: 14 U/L (ref 10–50)
BASOPHILS # BLD: 0 K/UL (ref 0–0.2)
BASOPHILS # BLD: 0.07 K/UL (ref 0–0.2)
BASOPHILS NFR BLD: 0 %
BASOPHILS NFR BLD: 1 % (ref 0–2)
BILIRUB DIRECT SERPL-MCNC: 0.2 MG/DL (ref 0–0.2)
BILIRUB INDIRECT SERPL-MCNC: 0.1 MG/DL
BILIRUB SERPL-MCNC: 0.3 MG/DL (ref 0–1.2)
BUN SERPL-MCNC: 12 MG/DL (ref 6–20)
BUN SERPL-MCNC: 14 MG/DL (ref 6–20)
CALCIUM SERPL-MCNC: 8.8 MG/DL (ref 8.6–10.4)
CALCIUM SERPL-MCNC: 9.1 MG/DL (ref 8.6–10.4)
CHLORIDE SERPL-SCNC: 107 MMOL/L (ref 98–107)
CHLORIDE SERPL-SCNC: 113 MMOL/L (ref 98–107)
CO2 SERPL-SCNC: 18 MMOL/L (ref 20–31)
CO2 SERPL-SCNC: 21 MMOL/L (ref 20–31)
CREAT SERPL-MCNC: 0.7 MG/DL (ref 0.7–1.2)
CREAT SERPL-MCNC: 0.9 MG/DL (ref 0.7–1.2)
DATE, STOOL #1: ABNORMAL
EOSINOPHIL # BLD: 0 K/UL (ref 0–0.4)
EOSINOPHIL # BLD: 0.07 K/UL (ref 0–0.44)
EOSINOPHILS RELATIVE PERCENT: 0 % (ref 1–4)
EOSINOPHILS RELATIVE PERCENT: 1 % (ref 1–4)
ERYTHROCYTE [DISTWIDTH] IN BLOOD BY AUTOMATED COUNT: 21.8 % (ref 11.8–14.4)
ERYTHROCYTE [DISTWIDTH] IN BLOOD BY AUTOMATED COUNT: 24.6 % (ref 11.8–14.4)
FIBRINOGEN PPP-MCNC: 267 MG/DL (ref 179–518)
GFR, ESTIMATED: >90 ML/MIN/1.73M2
GFR, ESTIMATED: >90 ML/MIN/1.73M2
GLUCOSE SERPL-MCNC: 108 MG/DL (ref 74–99)
GLUCOSE SERPL-MCNC: 136 MG/DL (ref 74–99)
HCT VFR BLD AUTO: 17.3 % (ref 40.7–50.3)
HCT VFR BLD AUTO: 23.7 % (ref 40.7–50.3)
HCT VFR BLD AUTO: 24.6 % (ref 40.7–50.3)
HCT VFR BLD AUTO: 27.6 % (ref 40.7–50.3)
HEMOCCULT SP1 STL QL: POSITIVE
HGB BLD-MCNC: 4 G/DL (ref 13–17)
HGB BLD-MCNC: 5.7 G/DL (ref 13–17)
HGB BLD-MCNC: 6.7 G/DL (ref 13–17)
HGB BLD-MCNC: 7.8 G/DL (ref 13–17)
IMM GRANULOCYTES # BLD AUTO: 0.34 K/UL (ref 0–0.3)
IMM GRANULOCYTES # BLD AUTO: 0.34 K/UL (ref 0–0.3)
IMM GRANULOCYTES NFR BLD: 4 %
IMM GRANULOCYTES NFR BLD: 5 %
INR PPP: 1.2
IRON SATN MFR SERPL: 4 % (ref 20–55)
IRON SERPL-MCNC: 14 UG/DL (ref 61–157)
LYMPHOCYTES NFR BLD: 0.8 K/UL (ref 1.1–3.7)
LYMPHOCYTES NFR BLD: 0.95 K/UL (ref 1–4.8)
LYMPHOCYTES RELATIVE PERCENT: 11 % (ref 24–44)
LYMPHOCYTES RELATIVE PERCENT: 12 % (ref 24–43)
MAGNESIUM SERPL-MCNC: 2 MG/DL (ref 1.6–2.6)
MCH RBC QN AUTO: 13.3 PG (ref 25.2–33.5)
MCH RBC QN AUTO: 14.7 PG (ref 25.2–33.5)
MCHC RBC AUTO-ENTMCNC: 23.1 G/DL (ref 28.4–34.8)
MCHC RBC AUTO-ENTMCNC: 24.1 G/DL (ref 28.4–34.8)
MCV RBC AUTO: 57.7 FL (ref 82.6–102.9)
MCV RBC AUTO: 61.2 FL (ref 82.6–102.9)
MONOCYTES NFR BLD: 0.17 K/UL (ref 0.2–0.8)
MONOCYTES NFR BLD: 0.2 K/UL (ref 0.1–1.2)
MONOCYTES NFR BLD: 2 % (ref 1–7)
MONOCYTES NFR BLD: 3 % (ref 3–12)
MORPHOLOGY: ABNORMAL
NEUTROPHILS NFR BLD: 78 % (ref 36–65)
NEUTROPHILS NFR BLD: 83 % (ref 36–66)
NEUTS SEG NFR BLD: 5.22 K/UL (ref 1.5–8.1)
NEUTS SEG NFR BLD: 7.14 K/UL (ref 1.8–7.7)
NRBC BLD-RTO: 1.2 PER 100 WBC
NRBC BLD-RTO: 1.6 PER 100 WBC
PARTIAL THROMBOPLASTIN TIME: 25.8 SEC (ref 23.9–33.8)
PLATELET # BLD AUTO: 275 K/UL (ref 138–453)
PLATELET # BLD AUTO: 296 K/UL (ref 138–453)
PMV BLD AUTO: ABNORMAL FL (ref 8.1–13.5)
PMV BLD AUTO: ABNORMAL FL (ref 8.1–13.5)
POTASSIUM SERPL-SCNC: 4 MMOL/L (ref 3.7–5.3)
POTASSIUM SERPL-SCNC: 4.6 MMOL/L (ref 3.7–5.3)
PROT SERPL-MCNC: 6.3 G/DL (ref 6.6–8.7)
PROTHROMBIN TIME: 16.1 SEC (ref 11.5–14.2)
RBC # BLD AUTO: 3 M/UL (ref 4.21–5.77)
RBC # BLD AUTO: 3.87 M/UL (ref 4.21–5.77)
SODIUM SERPL-SCNC: 141 MMOL/L (ref 136–145)
SODIUM SERPL-SCNC: 144 MMOL/L (ref 136–145)
TIBC SERPL-MCNC: 379 UG/DL (ref 250–450)
TIME, STOOL #1: 151
UNSATURATED IRON BINDING CAPACITY: 365 UG/DL (ref 112–347)
WBC OTHER # BLD: 6.7 K/UL (ref 3.5–11.3)
WBC OTHER # BLD: 8.6 K/UL (ref 3.5–11.3)

## 2025-03-22 PROCEDURE — 85730 THROMBOPLASTIN TIME PARTIAL: CPT

## 2025-03-22 PROCEDURE — 85610 PROTHROMBIN TIME: CPT

## 2025-03-22 PROCEDURE — 86850 RBC ANTIBODY SCREEN: CPT

## 2025-03-22 PROCEDURE — 2580000003 HC RX 258

## 2025-03-22 PROCEDURE — 36430 TRANSFUSION BLD/BLD COMPNT: CPT

## 2025-03-22 PROCEDURE — 6360000002 HC RX W HCPCS

## 2025-03-22 PROCEDURE — 96375 TX/PRO/DX INJ NEW DRUG ADDON: CPT

## 2025-03-22 PROCEDURE — 82272 OCCULT BLD FECES 1-3 TESTS: CPT

## 2025-03-22 PROCEDURE — 6360000002 HC RX W HCPCS: Performed by: NURSE PRACTITIONER

## 2025-03-22 PROCEDURE — 30233N1 TRANSFUSION OF NONAUTOLOGOUS RED BLOOD CELLS INTO PERIPHERAL VEIN, PERCUTANEOUS APPROACH: ICD-10-PCS | Performed by: INTERNAL MEDICINE

## 2025-03-22 PROCEDURE — 80076 HEPATIC FUNCTION PANEL: CPT

## 2025-03-22 PROCEDURE — 94761 N-INVAS EAR/PLS OXIMETRY MLT: CPT

## 2025-03-22 PROCEDURE — 2060000000 HC ICU INTERMEDIATE R&B

## 2025-03-22 PROCEDURE — 85014 HEMATOCRIT: CPT

## 2025-03-22 PROCEDURE — P9016 RBC LEUKOCYTES REDUCED: HCPCS

## 2025-03-22 PROCEDURE — 99222 1ST HOSP IP/OBS MODERATE 55: CPT | Performed by: INTERNAL MEDICINE

## 2025-03-22 PROCEDURE — 85018 HEMOGLOBIN: CPT

## 2025-03-22 PROCEDURE — 86920 COMPATIBILITY TEST SPIN: CPT

## 2025-03-22 PROCEDURE — 85025 COMPLETE CBC W/AUTO DIFF WBC: CPT

## 2025-03-22 PROCEDURE — 2580000003 HC RX 258: Performed by: EMERGENCY MEDICINE

## 2025-03-22 PROCEDURE — 85384 FIBRINOGEN ACTIVITY: CPT

## 2025-03-22 PROCEDURE — 86901 BLOOD TYPING SEROLOGIC RH(D): CPT

## 2025-03-22 PROCEDURE — 86900 BLOOD TYPING SEROLOGIC ABO: CPT

## 2025-03-22 PROCEDURE — 93005 ELECTROCARDIOGRAM TRACING: CPT | Performed by: NURSE PRACTITIONER

## 2025-03-22 PROCEDURE — 6370000000 HC RX 637 (ALT 250 FOR IP)

## 2025-03-22 PROCEDURE — 2500000003 HC RX 250 WO HCPCS

## 2025-03-22 PROCEDURE — 36415 COLL VENOUS BLD VENIPUNCTURE: CPT

## 2025-03-22 RX ORDER — METOPROLOL TARTRATE 1 MG/ML
5 INJECTION, SOLUTION INTRAVENOUS ONCE
Status: COMPLETED | OUTPATIENT
Start: 2025-03-22 | End: 2025-03-22

## 2025-03-22 RX ORDER — MORPHINE SULFATE 2 MG/ML
1 INJECTION, SOLUTION INTRAMUSCULAR; INTRAVENOUS EVERY 4 HOURS PRN
Status: DISCONTINUED | OUTPATIENT
Start: 2025-03-22 | End: 2025-03-25 | Stop reason: HOSPADM

## 2025-03-22 RX ORDER — ONDANSETRON 2 MG/ML
4 INJECTION INTRAMUSCULAR; INTRAVENOUS EVERY 6 HOURS PRN
Status: DISCONTINUED | OUTPATIENT
Start: 2025-03-22 | End: 2025-03-25 | Stop reason: HOSPADM

## 2025-03-22 RX ORDER — ONDANSETRON 4 MG/1
4 TABLET, ORALLY DISINTEGRATING ORAL EVERY 8 HOURS PRN
Status: DISCONTINUED | OUTPATIENT
Start: 2025-03-22 | End: 2025-03-25 | Stop reason: HOSPADM

## 2025-03-22 RX ORDER — SODIUM CHLORIDE 0.9 % (FLUSH) 0.9 %
5-40 SYRINGE (ML) INJECTION EVERY 12 HOURS SCHEDULED
Status: DISCONTINUED | OUTPATIENT
Start: 2025-03-22 | End: 2025-03-25 | Stop reason: HOSPADM

## 2025-03-22 RX ORDER — ACETAMINOPHEN 650 MG/1
650 SUPPOSITORY RECTAL EVERY 6 HOURS PRN
Status: DISCONTINUED | OUTPATIENT
Start: 2025-03-22 | End: 2025-03-25 | Stop reason: HOSPADM

## 2025-03-22 RX ORDER — ACETAMINOPHEN 325 MG/1
650 TABLET ORAL EVERY 6 HOURS PRN
Status: DISCONTINUED | OUTPATIENT
Start: 2025-03-22 | End: 2025-03-25 | Stop reason: HOSPADM

## 2025-03-22 RX ORDER — SODIUM CHLORIDE 9 MG/ML
INJECTION, SOLUTION INTRAVENOUS PRN
Status: DISCONTINUED | OUTPATIENT
Start: 2025-03-22 | End: 2025-03-25 | Stop reason: HOSPADM

## 2025-03-22 RX ORDER — SODIUM CHLORIDE 0.9 % (FLUSH) 0.9 %
5-40 SYRINGE (ML) INJECTION PRN
Status: DISCONTINUED | OUTPATIENT
Start: 2025-03-22 | End: 2025-03-25 | Stop reason: HOSPADM

## 2025-03-22 RX ORDER — SODIUM CHLORIDE 9 MG/ML
INJECTION, SOLUTION INTRAVENOUS CONTINUOUS
Status: DISCONTINUED | OUTPATIENT
Start: 2025-03-22 | End: 2025-03-22

## 2025-03-22 RX ORDER — 0.9 % SODIUM CHLORIDE 0.9 %
1000 INTRAVENOUS SOLUTION INTRAVENOUS ONCE
Status: COMPLETED | OUTPATIENT
Start: 2025-03-22 | End: 2025-03-22

## 2025-03-22 RX ADMIN — METOPROLOL TARTRATE 5 MG: 5 INJECTION INTRAVENOUS at 09:13

## 2025-03-22 RX ADMIN — SODIUM CHLORIDE 80 MG: 9 INJECTION INTRAMUSCULAR; INTRAVENOUS; SUBCUTANEOUS at 03:28

## 2025-03-22 RX ADMIN — ACETAMINOPHEN 650 MG: 325 TABLET, FILM COATED ORAL at 20:04

## 2025-03-22 RX ADMIN — SODIUM CHLORIDE: 0.9 INJECTION, SOLUTION INTRAVENOUS at 11:40

## 2025-03-22 RX ADMIN — KETOROLAC TROMETHAMINE 15 MG: 15 INJECTION, SOLUTION INTRAMUSCULAR; INTRAVENOUS at 00:06

## 2025-03-22 RX ADMIN — SODIUM CHLORIDE 1000 ML: 0.9 INJECTION, SOLUTION INTRAVENOUS at 00:17

## 2025-03-22 RX ADMIN — ACETAMINOPHEN 650 MG: 325 TABLET, FILM COATED ORAL at 12:19

## 2025-03-22 RX ADMIN — SODIUM CHLORIDE, PRESERVATIVE FREE 10 ML: 5 INJECTION INTRAVENOUS at 09:14

## 2025-03-22 ASSESSMENT — PAIN SCALES - GENERAL
PAINLEVEL_OUTOF10: 5
PAINLEVEL_OUTOF10: 3
PAINLEVEL_OUTOF10: 6
PAINLEVEL_OUTOF10: 3
PAINLEVEL_OUTOF10: 2
PAINLEVEL_OUTOF10: 0
PAINLEVEL_OUTOF10: 2

## 2025-03-22 ASSESSMENT — PAIN DESCRIPTION - ORIENTATION
ORIENTATION: ANTERIOR;MID;UPPER
ORIENTATION: LOWER

## 2025-03-22 ASSESSMENT — PAIN DESCRIPTION - ONSET
ONSET: ON-GOING
ONSET: GRADUAL
ONSET: ON-GOING

## 2025-03-22 ASSESSMENT — PAIN DESCRIPTION - PAIN TYPE
TYPE: CHRONIC PAIN

## 2025-03-22 ASSESSMENT — PAIN - FUNCTIONAL ASSESSMENT
PAIN_FUNCTIONAL_ASSESSMENT: ACTIVITIES ARE NOT PREVENTED
PAIN_FUNCTIONAL_ASSESSMENT: PREVENTS OR INTERFERES SOME ACTIVE ACTIVITIES AND ADLS

## 2025-03-22 ASSESSMENT — PAIN DESCRIPTION - DESCRIPTORS
DESCRIPTORS: ACHING
DESCRIPTORS: PRESSURE;THROBBING

## 2025-03-22 ASSESSMENT — PAIN DESCRIPTION - FREQUENCY
FREQUENCY: INTERMITTENT

## 2025-03-22 ASSESSMENT — PAIN DESCRIPTION - LOCATION
LOCATION: HEAD
LOCATION: BACK
LOCATION: HEAD

## 2025-03-22 NOTE — CONSULTS
[facility]  CONSULTATION      REASON FOR CONSULT: Anemia    REQUESTING PHYSICIAN:  Colton Yepez DO    HISTORY OF PRESENT ILLNESS:    The patient is a 25 y.o. male who presents with anemia.  He presented to the hospital with dizziness and lightheadedness.  In the ER he is found to be tachycardic.  Hemoglobin 4.0 with MCV 57 and RDW 21.  Hemoccult positive stool.    He was seen by our group in January 2024 when he presented to the emergency room with dizziness an syncope  Hemoglobin at that time was 9.1. He was discharged from the ED with recommendations for iron supplements. He continued to not feel well with follow-up hemoglobin 5.6.  In 2024, he reported episodes of melena at home prior to starting iron supplements. He denied history of GI bleed but admits to taking numerous Ibuprofen daily for chronic back pain.     February, 2024  Findings::   Esophagus: Nodularity at GE jxn.  Previously biopsied (benign).   Stomach: normal  Duodenum: Distal duodenal ulcerated flat mass around 1.5 cm in size (suspect ulceration from prior cautery).  Center of lesion friable appearing.  Careful biopsies obtained.  Bleeding resulted.  Hemostasis achieved with epi injection, bipolar cautery and 2 hemoclips.    Duodenum, mass, biopsy:   -Focal ulcer with granulation tissue.       January, 2024  Findings::   Esophagus: Several small irregular appearing nodules at GE jxn.  Biopsied.   Stomach: normal, biopsied.  Duodenum: Several small bulbar erosions.  Distal duodenal mass like lesion (around 2 cm) with active bleeding associated with it.  Needed peds colonoscope to access the lesion.  Oozing blood from surface of lesion.  Mass was atypical and not classic for submucosal or malignancy.  Hemostasis achieved using epi injection, Bipolar cautery, and 2 hemoclips.  .  STOMACH, BIOPSY:-GASTRIC MUCOSA WITH

## 2025-03-22 NOTE — ED NOTES
Pt presents to the er c/o a headache for four days dry eyes chills fever and dizziness for a few days

## 2025-03-22 NOTE — ED PROVIDER NOTES
[facility] EMERGENCY DEPARTMENT  eMERGENCY dEPARTMENT eNCOUnter      Pt Name: Nicholas Martinez  MRN: [id number]  Birthdate 2000  Date of evaluation: 3/21/2025  Provider: XANDER Lovelace CNP    CHIEF COMPLAINT       Chief Complaint   Patient presents with    Headache     Patient states he has the headache. Patient states the headache has been constant for a few days. Patient states his eyes are dry with a bad smell stuck in his nose. Patient states he has had headache in the past that was relieved in the past with over the counter meds. Patient states he has also had chills and a little fever.     Dizziness         HISTORY OF PRESENT ILLNESS  (Location/Symptom, Timing/Onset, Context/Setting, Quality, Duration, Modifying Factors, Severity.)   Nicholas Martinez is a 25 y.o. male who presents to the emergency department. C/o headache, dizziness, fatigue, chills. Reports a \"bad smell\" in his nose. Onset was a few days ago.  Denies fever, vision changes, cough, CP, SOB. Denies abd pain, N/V/D. Rates his pain 5/10. Tylenol has not been helping with his headache.      Nursing Notes were reviewed.    ALLERGIES     Patient has no known allergies.    CURRENT MEDICATIONS       Previous Medications    No medications on file       PAST MEDICAL HISTORY         Diagnosis Date    Acid reflux     Anemia     Anemia     Asperger syndrome     Asthma     As a child, resolved now.    Autism     Upper GI bleed        SURGICAL HISTORY           Procedure Laterality Date    UPPER GASTROINTESTINAL ENDOSCOPY N/A 1/23/2024    EGD BIOPSY performed by Josiah Del Toro MD at [facility] OR    UPPER GASTROINTESTINAL ENDOSCOPY N/A 2/14/2024    EGD  ENTEROSCOPY PUSH DIAGNOSTIC FORCEP BIOPSY, EPINEPHRINE INJECTION, CAUTERY, CLIPPED performed by Josiah Del Toro MD at [facility] OR         FAMILY HISTORY     History reviewed. No pertinent family history.  No family status information on file.        SOCIAL HISTORY      reports that he has

## 2025-03-22 NOTE — CONSENT
Informed Consent for Blood Component Transfusion Note    I have discussed with the patient the rationale for blood component transfusion; its benefits in treating or preventing fatigue, organ damage, or death; and its risk which includes mild transfusion reactions, rare risk of blood borne infection, or more serious but rare reactions. I have discussed the alternatives to transfusion, including the risk and consequences of not receiving transfusion. The patient had an opportunity to ask questions and had agreed to proceed with transfusion of blood components.    Electronically signed by Erica B Goldberger, MD on 3/22/25 at 2:47 AM EDT

## 2025-03-22 NOTE — ED PROVIDER NOTES
EMERGENCY DEPARTMENT ENCOUNTER   ATTENDING ATTESTATION     Pt Name: Nicholas Martinez  MRN: [id number]  Birthdate 2000  Date of evaluation: 3/22/25   Nicholas aMrtinez is a 25 y.o. male with CC: Headache (Patient states he has the headache. Patient states the headache has been constant for a few days. Patient states his eyes are dry with a bad smell stuck in his nose. Patient states he has had headache in the past that was relieved in the past with over the counter meds. Patient states he has also had chills and a little fever. ) and Dizziness    MDM:   I performed a substantive part of the MDM during the patient's E/M visit. I personally evaluated and examined the patient. I personally made or approved the documented management plan and acknowledge its risk of complications.    \Patient evaluated by myself.  25-year-old male presenting to the emergency room with lightheadedness dizziness and headache.  Patient tachycardic here in the ED.  Blood pressure remained stable.  Patient found to be severely anemic with hemoglobin of 4.0.  He does have history of GI bleed.    Patient be admitted to internal medicine.  Patient agreeable to blood transfusion here in the ED which was ordered.    CRITICAL CARE:       EKG: All EKG's are interpreted by the Emergency Department Physician who either signs or Co-signs this chart in the absence of a cardiologist.    Sinus tachycardia ventricular rate 127  Nonspecific T wave changes  No STEMI criteria    QTc 467    RADIOLOGY:All plain film, CT, MRI, and formal ultrasound images (except ED bedside ultrasound) are read by the radiologist, see reports below, unless otherwise noted in MDM or here.  CT HEAD WO CONTRAST   Final Result   No acute intracranial abnormality.           LABS: All lab results were reviewed by myself, and all abnormals are listed below.  Labs Reviewed   BASIC METABOLIC PANEL - Abnormal; Notable for the following components:       Result Value    Glucose 108

## 2025-03-22 NOTE — PLAN OF CARE
Problem: Discharge Planning  Goal: Discharge to home or other facility with appropriate resources  Outcome: Progressing  Flowsheets  Taken 3/22/2025 1230  Discharge to home or other facility with appropriate resources:   Identify barriers to discharge with patient and caregiver   Arrange for needed discharge resources and transportation as appropriate   Identify discharge learning needs (meds, wound care, etc)   Refer to discharge planning if patient needs post-hospital services based on physician order or complex needs related to functional status, cognitive ability or social support system  Taken 3/22/2025 0900  Discharge to home or other facility with appropriate resources:   Identify barriers to discharge with patient and caregiver   Arrange for needed discharge resources and transportation as appropriate   Identify discharge learning needs (meds, wound care, etc)   Refer to discharge planning if patient needs post-hospital services based on physician order or complex needs related to functional status, cognitive ability or social support system     Problem: Pain  Goal: Verbalizes/displays adequate comfort level or baseline comfort level  Outcome: Progressing  Flowsheets  Taken 3/22/2025 1219  Verbalizes/displays adequate comfort level or baseline comfort level:   Encourage patient to monitor pain and request assistance   Assess pain using appropriate pain scale   Administer analgesics based on type and severity of pain and evaluate response   Implement non-pharmacological measures as appropriate and evaluate response   Notify Licensed Independent Practitioner if interventions unsuccessful or patient reports new pain  Taken 3/22/2025 0900  Verbalizes/displays adequate comfort level or baseline comfort level:   Encourage patient to monitor pain and request assistance   Assess pain using appropriate pain scale   Administer analgesics based on type and severity of pain and evaluate response   Implement

## 2025-03-22 NOTE — CARE COORDINATION
Case Management Assessment  Initial Evaluation    Date/Time of Evaluation: 3/22/2025 1:33 PM  Assessment Completed by: ADAM MARIN RN    If patient is discharged prior to next notation, then this note serves as note for discharge by case management.    Patient Name: Nicholas Martinez                   YOB: 2000  Diagnosis: Acute lower GI bleeding [K92.2]                   Date / Time: 3/21/2025 10:28 PM    Patient Admission Status: Inpatient   Readmission Risk (Low < 19, Mod (19-27), High > 27): Readmission Risk Score: 9    Current PCP: Ysabel Dillard APRN - NP  PCP verified by CM? Yes    Chart Reviewed: Yes      History Provided by: Patient  Patient Orientation: Alert and Oriented, Person, Place, Situation, Self    Patient Cognition: Alert    Hospitalization in the last 30 days (Readmission):  No    If yes, Readmission Assessment in CM Navigator will be completed.    Advance Directives:      Code Status: Full Code   Patient's Primary Decision Maker is: Legal Next of Kin      Discharge Planning:    Patient lives with: Parent Type of Home: Apartment  Primary Care Giver: Self  Patient Support Systems include: Parent   Current Financial resources: None  Current community resources: None  Current services prior to admission: None            Current DME:              Type of Home Care services:  None    ADLS  Prior functional level: Independent in ADLs/IADLs  Current functional level: Independent in ADLs/IADLs, Assistance with the following:, Cooking, Housework, Shopping, Mobility    PT AM-PAC:   /24  OT AM-PAC:   /24    Family can provide assistance at DC: Yes  Would you like Case Management to discuss the discharge plan with any other family members/significant others, and if so, who? Yes (parent)  Plans to Return to Present Housing: Yes  Other Identified Issues/Barriers to RETURNING to current housing: n/a  Potential Assistance needed at discharge: N/A            Potential DME:    Patient expects

## 2025-03-22 NOTE — H&P
[facility]  Office: [phone]  Sunday Bello DO, Miguel A Quinn DO, Rodolfo Parr DO, Colton Yepez DO, Earl Hu MD, Aye Saavedar MD, Silverio Oshea MD, Leydi Barnett MD,  Albino Landis MD, Maribell Emery MD, Ankit Corral MD,  Jessica Peoples DO, Patria Echevarria MD, Renzo Nobles MD, Js Bello DO, Joselyn Richards MD,  Perfecto Younger DO, Sierra Peck MD, Jazz Burris MD, Karthikeyan Guardado MD,  Bubba Ferrera MD, Mary Chapa MD, Jossie Landers MD, Sal Celestin MD, Gonzales Bravo MD, Kaykay Barakat MD, Mike Acosta DO, Malik Jeffery MD, Jessica Frederick MD, Mohsin Reza, MD, Shirley Waterhouse, CNP,  Mami Guillen, CNP, Mike Collins, CNP,  Monae Anderson, DNP, Belia Au, CNP, Tanya Wilson, CNP, Ml Lee, CNP, Carol Saab, CNP, Hanane Wild PA-C, Adrianne Humphrey, CNP, Brenda Fuentes, CNP,  Radha Bright, CNP, Maki Holly, CNP, Taryn Valenzuela, CNP,  Shaye Verde, CNS, Pamela Fulton, CNP, Iman Phoenix, CNP,   Marilia Ferguson, CNP         [facility]   IN-PATIENT SERVICE   [facility]    HISTORY AND PHYSICAL EXAMINATION            Date:   3/22/2025  Patient name:  Nicholas Martinez  Date of admission:  3/21/2025 10:28 PM  MRN:   [id number]  Account:  [id number]  YOB: 2000  PCP:    Ysabel Dillard APRN - NP  Room:   [address] [place] [address]  Code Status:    Full Code    Chief Complaint:     Chief Complaint   Patient presents with    Headache     Patient states he has the headache. Patient states the headache has been constant for a few days. Patient states his eyes are dry with a bad smell stuck in his nose. Patient states he has had headache in the past that was relieved in the past with over the counter meds. Patient states he has also had chills and a little fever.     Dizziness       History Obtained From:     patient, electronic medical record    History of Present Illness:     Nicholas Martinez is a 25 y.o. Non- / non

## 2025-03-23 LAB
ABO/RH: NORMAL
ANION GAP SERPL CALCULATED.3IONS-SCNC: 12 MMOL/L (ref 9–16)
ANTIBODY SCREEN: NEGATIVE
ARM BAND NUMBER: NORMAL
BASOPHILS # BLD: 0.09 K/UL (ref 0–0.2)
BASOPHILS NFR BLD: 1 %
BLOOD BANK DISPENSE STATUS: NORMAL
BLOOD BANK SAMPLE EXPIRATION: NORMAL
BPU ID: NORMAL
BUN SERPL-MCNC: 13 MG/DL (ref 6–20)
CALCIUM SERPL-MCNC: 8.6 MG/DL (ref 8.6–10.4)
CHLORIDE SERPL-SCNC: 108 MMOL/L (ref 98–107)
CO2 SERPL-SCNC: 19 MMOL/L (ref 20–31)
COMPONENT: NORMAL
CREAT SERPL-MCNC: 0.8 MG/DL (ref 0.7–1.2)
CROSSMATCH RESULT: NORMAL
EKG ATRIAL RATE: 127 BPM
EKG P AXIS: 53 DEGREES
EKG P-R INTERVAL: 134 MS
EKG Q-T INTERVAL: 322 MS
EKG QRS DURATION: 76 MS
EKG QTC CALCULATION (BAZETT): 467 MS
EKG R AXIS: 56 DEGREES
EKG T AXIS: 68 DEGREES
EKG VENTRICULAR RATE: 127 BPM
EOSINOPHIL # BLD: 0 K/UL (ref 0–0.4)
EOSINOPHILS RELATIVE PERCENT: 0 % (ref 1–4)
ERYTHROCYTE [DISTWIDTH] IN BLOOD BY AUTOMATED COUNT: 31 % (ref 11.8–14.4)
GFR, ESTIMATED: >90 ML/MIN/1.73M2
GLUCOSE SERPL-MCNC: 116 MG/DL (ref 74–99)
HCT VFR BLD AUTO: 26.7 % (ref 40.7–50.3)
HCT VFR BLD AUTO: 27 % (ref 40.7–50.3)
HCT VFR BLD AUTO: 27 % (ref 40.7–50.3)
HGB BLD-MCNC: 7.5 G/DL (ref 13–17)
HGB BLD-MCNC: 7.5 G/DL (ref 13–17)
HGB BLD-MCNC: 7.6 G/DL (ref 13–17)
IMM GRANULOCYTES # BLD AUTO: 0.09 K/UL (ref 0–0.3)
IMM GRANULOCYTES NFR BLD: 1 %
LYMPHOCYTES NFR BLD: 2.05 K/UL (ref 1–4.8)
LYMPHOCYTES RELATIVE PERCENT: 23 % (ref 24–44)
MCH RBC QN AUTO: 18.8 PG (ref 25.2–33.5)
MCHC RBC AUTO-ENTMCNC: 27.8 G/DL (ref 28.4–34.8)
MCV RBC AUTO: 67.7 FL (ref 82.6–102.9)
MONOCYTES NFR BLD: 0.71 K/UL (ref 0.2–0.8)
MONOCYTES NFR BLD: 8 % (ref 1–7)
MORPHOLOGY: ABNORMAL
NEUTROPHILS NFR BLD: 67 % (ref 36–66)
NEUTS SEG NFR BLD: 5.96 K/UL (ref 1.8–7.7)
NRBC BLD-RTO: 1 PER 100 WBC
PLATELET # BLD AUTO: 246 K/UL (ref 138–453)
PMV BLD AUTO: ABNORMAL FL (ref 8.1–13.5)
POTASSIUM SERPL-SCNC: 4.5 MMOL/L (ref 3.7–5.3)
RBC # BLD AUTO: 3.99 M/UL (ref 4.21–5.77)
SODIUM SERPL-SCNC: 139 MMOL/L (ref 136–145)
TRANSFUSION STATUS: NORMAL
UNIT DIVISION: 0
WBC OTHER # BLD: 8.9 K/UL (ref 3.5–11.3)

## 2025-03-23 PROCEDURE — 85025 COMPLETE CBC W/AUTO DIFF WBC: CPT

## 2025-03-23 PROCEDURE — 6360000002 HC RX W HCPCS: Performed by: INTERNAL MEDICINE

## 2025-03-23 PROCEDURE — 99232 SBSQ HOSP IP/OBS MODERATE 35: CPT | Performed by: INTERNAL MEDICINE

## 2025-03-23 PROCEDURE — 2500000003 HC RX 250 WO HCPCS

## 2025-03-23 PROCEDURE — 6370000000 HC RX 637 (ALT 250 FOR IP)

## 2025-03-23 PROCEDURE — 36415 COLL VENOUS BLD VENIPUNCTURE: CPT

## 2025-03-23 PROCEDURE — 85018 HEMOGLOBIN: CPT

## 2025-03-23 PROCEDURE — 2060000000 HC ICU INTERMEDIATE R&B

## 2025-03-23 PROCEDURE — 2580000003 HC RX 258: Performed by: INTERNAL MEDICINE

## 2025-03-23 PROCEDURE — 85014 HEMATOCRIT: CPT

## 2025-03-23 PROCEDURE — 80048 BASIC METABOLIC PNL TOTAL CA: CPT

## 2025-03-23 RX ADMIN — SODIUM CHLORIDE, PRESERVATIVE FREE 10 ML: 5 INJECTION INTRAVENOUS at 09:50

## 2025-03-23 RX ADMIN — SODIUM CHLORIDE, PRESERVATIVE FREE 10 ML: 5 INJECTION INTRAVENOUS at 20:36

## 2025-03-23 RX ADMIN — SODIUM CHLORIDE, PRESERVATIVE FREE 40 MG: 5 INJECTION INTRAVENOUS at 16:00

## 2025-03-23 RX ADMIN — ACETAMINOPHEN 650 MG: 325 TABLET, FILM COATED ORAL at 02:34

## 2025-03-23 RX ADMIN — ACETAMINOPHEN 650 MG: 325 TABLET, FILM COATED ORAL at 23:07

## 2025-03-23 RX ADMIN — SODIUM CHLORIDE, PRESERVATIVE FREE 40 MG: 5 INJECTION INTRAVENOUS at 02:34

## 2025-03-23 RX ADMIN — ACETAMINOPHEN 650 MG: 325 TABLET, FILM COATED ORAL at 09:48

## 2025-03-23 ASSESSMENT — PAIN DESCRIPTION - LOCATION
LOCATION: BACK
LOCATION: HEAD
LOCATION: BACK

## 2025-03-23 ASSESSMENT — PAIN SCALES - GENERAL
PAINLEVEL_OUTOF10: 2
PAINLEVEL_OUTOF10: 2
PAINLEVEL_OUTOF10: 3
PAINLEVEL_OUTOF10: 3
PAINLEVEL_OUTOF10: 0

## 2025-03-23 ASSESSMENT — PAIN DESCRIPTION - DESCRIPTORS
DESCRIPTORS: ACHING

## 2025-03-23 ASSESSMENT — PAIN DESCRIPTION - PAIN TYPE: TYPE: CHRONIC PAIN

## 2025-03-23 ASSESSMENT — PAIN - FUNCTIONAL ASSESSMENT
PAIN_FUNCTIONAL_ASSESSMENT: ACTIVITIES ARE NOT PREVENTED
PAIN_FUNCTIONAL_ASSESSMENT: ACTIVITIES ARE NOT PREVENTED

## 2025-03-23 ASSESSMENT — PAIN DESCRIPTION - ONSET: ONSET: ON-GOING

## 2025-03-23 ASSESSMENT — PAIN DESCRIPTION - FREQUENCY: FREQUENCY: INTERMITTENT

## 2025-03-23 ASSESSMENT — PAIN DESCRIPTION - ORIENTATION: ORIENTATION: LOWER

## 2025-03-23 NOTE — PLAN OF CARE
GI bleed - had 3 units of blood- HGB stable today. EGD tomorrow   Up independently   Vitals stable  Headache this morning- tylenol x1    Problem: Gastrointestinal - Adult  Goal: Minimal or absence of nausea and vomiting  3/23/2025 1608 by sIa Sol RN  Outcome: Progressing     Problem: Metabolic/Fluid and Electrolytes - Adult  Goal: Hemodynamic stability and optimal renal function maintained  3/23/2025 1608 by Isa Sol, RN  Outcome: Progressing     Problem: Hematologic - Adult  Goal: Maintains hematologic stability  3/23/2025 1608 by Isa Sol RN  Outcome: Progressing

## 2025-03-23 NOTE — PLAN OF CARE
Problem: Discharge Planning  Goal: Discharge to home or other facility with appropriate resources  3/22/2025 2155 by Abel Zeng RN  Outcome: Progressing  Flowsheets (Taken 3/22/2025 2000)  Discharge to home or other facility with appropriate resources:   Identify barriers to discharge with patient and caregiver   Arrange for needed discharge resources and transportation as appropriate   Refer to discharge planning if patient needs post-hospital services based on physician order or complex needs related to functional status, cognitive ability or social support system  3/22/2025 1335 by Lydia Kamara, RN  Outcome: Progressing  Flowsheets  Taken 3/22/2025 1230  Discharge to home or other facility with appropriate resources:   Identify barriers to discharge with patient and caregiver   Arrange for needed discharge resources and transportation as appropriate   Identify discharge learning needs (meds, wound care, etc)   Refer to discharge planning if patient needs post-hospital services based on physician order or complex needs related to functional status, cognitive ability or social support system  Taken 3/22/2025 0900  Discharge to home or other facility with appropriate resources:   Identify barriers to discharge with patient and caregiver   Arrange for needed discharge resources and transportation as appropriate   Identify discharge learning needs (meds, wound care, etc)   Refer to discharge planning if patient needs post-hospital services based on physician order or complex needs related to functional status, cognitive ability or social support system     Problem: Pain  Goal: Verbalizes/displays adequate comfort level or baseline comfort level  3/22/2025 2155 by Abel Zeng, RN  Outcome: Progressing  3/22/2025 1335 by Lydia Kamara, RN  Outcome: Progressing  Flowsheets  Taken 3/22/2025 1219  Verbalizes/displays adequate comfort level or baseline comfort level:   Encourage patient to monitor pain and

## 2025-03-24 ENCOUNTER — APPOINTMENT (OUTPATIENT)
Dept: CT IMAGING | Age: 25
DRG: 253 | End: 2025-03-24
Payer: COMMERCIAL

## 2025-03-24 ENCOUNTER — ANESTHESIA EVENT (OUTPATIENT)
Dept: OPERATING ROOM | Age: 25
DRG: 253 | End: 2025-03-24
Payer: COMMERCIAL

## 2025-03-24 ENCOUNTER — ANESTHESIA (OUTPATIENT)
Dept: OPERATING ROOM | Age: 25
DRG: 253 | End: 2025-03-24
Payer: COMMERCIAL

## 2025-03-24 LAB
ANION GAP SERPL CALCULATED.3IONS-SCNC: 13 MMOL/L (ref 9–16)
BUN SERPL-MCNC: 11 MG/DL (ref 6–20)
CALCIUM SERPL-MCNC: 8.9 MG/DL (ref 8.6–10.4)
CHLORIDE SERPL-SCNC: 104 MMOL/L (ref 98–107)
CO2 SERPL-SCNC: 22 MMOL/L (ref 20–31)
CREAT SERPL-MCNC: 0.8 MG/DL (ref 0.7–1.2)
GFR, ESTIMATED: >90 ML/MIN/1.73M2
GLUCOSE SERPL-MCNC: 103 MG/DL (ref 74–99)
HCT VFR BLD AUTO: 27.7 % (ref 40.7–50.3)
HCT VFR BLD AUTO: 29.2 % (ref 40.7–50.3)
HGB BLD-MCNC: 7.6 G/DL (ref 13–17)
HGB BLD-MCNC: 8.1 G/DL (ref 13–17)
POTASSIUM SERPL-SCNC: 4 MMOL/L (ref 3.7–5.3)
SODIUM SERPL-SCNC: 140 MMOL/L (ref 136–145)

## 2025-03-24 PROCEDURE — 6360000002 HC RX W HCPCS: Performed by: INTERNAL MEDICINE

## 2025-03-24 PROCEDURE — 85018 HEMOGLOBIN: CPT

## 2025-03-24 PROCEDURE — 2580000003 HC RX 258: Performed by: INTERNAL MEDICINE

## 2025-03-24 PROCEDURE — 88341 IMHCHEM/IMCYTCHM EA ADD ANTB: CPT

## 2025-03-24 PROCEDURE — 7100000000 HC PACU RECOVERY - FIRST 15 MIN: Performed by: INTERNAL MEDICINE

## 2025-03-24 PROCEDURE — 3609013800 HC EGD SUBMUCOSAL/BOTOX INJECTION: Performed by: INTERNAL MEDICINE

## 2025-03-24 PROCEDURE — 3609012400 HC EGD TRANSORAL BIOPSY SINGLE/MULTIPLE: Performed by: INTERNAL MEDICINE

## 2025-03-24 PROCEDURE — 2500000003 HC RX 250 WO HCPCS: Performed by: INTERNAL MEDICINE

## 2025-03-24 PROCEDURE — 80048 BASIC METABOLIC PNL TOTAL CA: CPT

## 2025-03-24 PROCEDURE — 1200000000 HC SEMI PRIVATE

## 2025-03-24 PROCEDURE — 7100000001 HC PACU RECOVERY - ADDTL 15 MIN: Performed by: INTERNAL MEDICINE

## 2025-03-24 PROCEDURE — 6360000004 HC RX CONTRAST MEDICATION: Performed by: INTERNAL MEDICINE

## 2025-03-24 PROCEDURE — 2709999900 HC NON-CHARGEABLE SUPPLY: Performed by: INTERNAL MEDICINE

## 2025-03-24 PROCEDURE — 99232 SBSQ HOSP IP/OBS MODERATE 35: CPT | Performed by: INTERNAL MEDICINE

## 2025-03-24 PROCEDURE — 2500000003 HC RX 250 WO HCPCS

## 2025-03-24 PROCEDURE — 6370000000 HC RX 637 (ALT 250 FOR IP): Performed by: INTERNAL MEDICINE

## 2025-03-24 PROCEDURE — 74177 CT ABD & PELVIS W/CONTRAST: CPT

## 2025-03-24 PROCEDURE — 88305 TISSUE EXAM BY PATHOLOGIST: CPT

## 2025-03-24 PROCEDURE — 36415 COLL VENOUS BLD VENIPUNCTURE: CPT

## 2025-03-24 PROCEDURE — 85014 HEMATOCRIT: CPT

## 2025-03-24 PROCEDURE — 6360000002 HC RX W HCPCS: Performed by: NURSE ANESTHETIST, CERTIFIED REGISTERED

## 2025-03-24 PROCEDURE — 0DB98ZX EXCISION OF DUODENUM, VIA NATURAL OR ARTIFICIAL OPENING ENDOSCOPIC, DIAGNOSTIC: ICD-10-PCS | Performed by: INTERNAL MEDICINE

## 2025-03-24 PROCEDURE — 2580000003 HC RX 258: Performed by: NURSE ANESTHETIST, CERTIFIED REGISTERED

## 2025-03-24 PROCEDURE — 88342 IMHCHEM/IMCYTCHM 1ST ANTB: CPT

## 2025-03-24 PROCEDURE — 3700000000 HC ANESTHESIA ATTENDED CARE: Performed by: INTERNAL MEDICINE

## 2025-03-24 PROCEDURE — 0DB48ZX EXCISION OF ESOPHAGOGASTRIC JUNCTION, VIA NATURAL OR ARTIFICIAL OPENING ENDOSCOPIC, DIAGNOSTIC: ICD-10-PCS | Performed by: INTERNAL MEDICINE

## 2025-03-24 RX ORDER — IOPAMIDOL 755 MG/ML
75 INJECTION, SOLUTION INTRAVASCULAR
Status: COMPLETED | OUTPATIENT
Start: 2025-03-24 | End: 2025-03-24

## 2025-03-24 RX ORDER — PROPOFOL 10 MG/ML
INJECTION, EMULSION INTRAVENOUS
Status: DISCONTINUED | OUTPATIENT
Start: 2025-03-24 | End: 2025-03-24 | Stop reason: SDUPTHER

## 2025-03-24 RX ORDER — LIDOCAINE HYDROCHLORIDE 20 MG/ML
INJECTION, SOLUTION EPIDURAL; INFILTRATION; INTRACAUDAL; PERINEURAL
Status: DISCONTINUED | OUTPATIENT
Start: 2025-03-24 | End: 2025-03-24 | Stop reason: SDUPTHER

## 2025-03-24 RX ORDER — SODIUM CHLORIDE 9 MG/ML
INJECTION, SOLUTION INTRAVENOUS
Status: DISCONTINUED | OUTPATIENT
Start: 2025-03-24 | End: 2025-03-24 | Stop reason: SDUPTHER

## 2025-03-24 RX ORDER — 0.9 % SODIUM CHLORIDE 0.9 %
80 INTRAVENOUS SOLUTION INTRAVENOUS ONCE
Status: COMPLETED | OUTPATIENT
Start: 2025-03-24 | End: 2025-03-24

## 2025-03-24 RX ORDER — SODIUM CHLORIDE 0.9 % (FLUSH) 0.9 %
10 SYRINGE (ML) INJECTION PRN
Status: DISCONTINUED | OUTPATIENT
Start: 2025-03-24 | End: 2025-03-25 | Stop reason: HOSPADM

## 2025-03-24 RX ADMIN — SODIUM CHLORIDE, PRESERVATIVE FREE 10 ML: 5 INJECTION INTRAVENOUS at 07:18

## 2025-03-24 RX ADMIN — PROPOFOL 20 MG: 10 INJECTION, EMULSION INTRAVENOUS at 13:49

## 2025-03-24 RX ADMIN — SODIUM CHLORIDE: 9 INJECTION, SOLUTION INTRAVENOUS at 13:40

## 2025-03-24 RX ADMIN — IOPAMIDOL 75 ML: 755 INJECTION, SOLUTION INTRAVENOUS at 23:40

## 2025-03-24 RX ADMIN — SODIUM CHLORIDE, PRESERVATIVE FREE 10 ML: 5 INJECTION INTRAVENOUS at 23:43

## 2025-03-24 RX ADMIN — ACETAMINOPHEN 650 MG: 325 TABLET, FILM COATED ORAL at 23:46

## 2025-03-24 RX ADMIN — SODIUM CHLORIDE, PRESERVATIVE FREE 40 MG: 5 INJECTION INTRAVENOUS at 14:45

## 2025-03-24 RX ADMIN — SODIUM CHLORIDE, PRESERVATIVE FREE 40 MG: 5 INJECTION INTRAVENOUS at 02:34

## 2025-03-24 RX ADMIN — SODIUM CHLORIDE 80 ML: 9 INJECTION, SOLUTION INTRAVENOUS at 23:43

## 2025-03-24 RX ADMIN — LIDOCAINE HYDROCHLORIDE 80 MG: 20 INJECTION, SOLUTION EPIDURAL; INFILTRATION; INTRACAUDAL; PERINEURAL at 13:43

## 2025-03-24 RX ADMIN — PROPOFOL 50 MG: 10 INJECTION, EMULSION INTRAVENOUS at 13:43

## 2025-03-24 RX ADMIN — PROPOFOL 20 MG: 10 INJECTION, EMULSION INTRAVENOUS at 13:47

## 2025-03-24 RX ADMIN — SODIUM CHLORIDE, PRESERVATIVE FREE 10 ML: 5 INJECTION INTRAVENOUS at 22:15

## 2025-03-24 RX ADMIN — PROPOFOL 50 MG: 10 INJECTION, EMULSION INTRAVENOUS at 13:45

## 2025-03-24 ASSESSMENT — PAIN - FUNCTIONAL ASSESSMENT: PAIN_FUNCTIONAL_ASSESSMENT: ACTIVITIES ARE NOT PREVENTED

## 2025-03-24 ASSESSMENT — PAIN DESCRIPTION - LOCATION
LOCATION: ABDOMEN
LOCATION: BACK

## 2025-03-24 ASSESSMENT — PAIN DESCRIPTION - DESCRIPTORS: DESCRIPTORS: ACHING

## 2025-03-24 ASSESSMENT — PAIN SCALES - GENERAL
PAINLEVEL_OUTOF10: 3
PAINLEVEL_OUTOF10: 0
PAINLEVEL_OUTOF10: 2

## 2025-03-24 NOTE — PLAN OF CARE
Pt up ad roman. Hgb stable. Hgb draw Q8. NPO as of midnight. Plan for EGD today.   Problem: Safety - Adult  Goal: Free from fall injury  Outcome: Progressing     Problem: Hematologic - Adult  Goal: Maintains hematologic stability  3/24/2025 0356 by Kalina Thomason, RN  Outcome: Progressing     Problem: Discharge Planning  Goal: Discharge to home or other facility with appropriate resources  Outcome: Progressing

## 2025-03-24 NOTE — OP NOTE
[facility]  EGD  Patient: Nicholas Martinez            Date:   3/24/2025  : 2000       Med Rec#: [id number]     PROCEDURE:  EGD with biopsy and tattoo  INDICATION: Anemia, upper GI bleeding, history of duodenal mass    FINDINGS  Nodules at gastroesophageal junction, benign-appearing, biopsied.  Irregular Z-line.  The stomach was otherwise normal in appearance  Proximal duodenum was normal  In the distal duodenum, beyond the papilla, was a sessile polypoid lesion approximately 4 to 5 cm in greatest dimension.  It was actively oozing blood.  Biopsies were obtained.  There was no endoscopic treatable focus.  A tattoo, 4 mL of a spot, was placed immediately proximal to the lesion.    PLAN   Await pathology results   Continue PPI   Continue serial hemoglobins with transfusions as necessary  This lesion is actively oozing blood, has been present for at least 1 year, and is not endoscopically treatable at this time.  Surgical consultation will be required.    MEDICATIONS:  MAC    ESTIMATED BLOOD LOSS: No blood loss due to procedure  Specimen(s) Removed: As stated above  COMPLICATIONS: No immediate complications  Prior to the procedure, the patient's history was reviewed and a directed physical examination was performed.   Informed consent was obtained.  After adequate sedation was achieved, the scope was inserted into the mouth and advanced to the second portion of the duodenum.  As the scope was withdrawn, the anatomy and the appearance of the mucosa was noted.     Sinan Villalobos M.D.

## 2025-03-24 NOTE — PLAN OF CARE
Pt admitted for gi bleed and anemia, pt doing well today, last hgb 8.1, pt had EGD today with new general surgery consult, pt now on clear liquid diet, pt denies any pain, vss, other treatments continue     Problem: Cardiovascular - Adult  Goal: Maintains optimal cardiac output and hemodynamic stability  Outcome: Progressing     Problem: Gastrointestinal - Adult  Goal: Minimal or absence of nausea and vomiting  Outcome: Progressing     Problem: Safety - Adult  Goal: Free from fall injury  3/24/2025 1547 by Ml Velázquez, RN  Outcome: Progressing   Patient has remained free from falls this shift. Patient is alert and oriented times four. Bed to lowest position with door open. Patient care items and call light in reach. Patient uses call light appropriately for assist. Will continue to monitor. Please see fall assessment.

## 2025-03-24 NOTE — ANESTHESIA POSTPROCEDURE EVALUATION
Department of Anesthesiology  Postprocedure Note    Patient: Nicholas Martinez  MRN: [id number]  YOB: 2000  Date of evaluation: 3/24/2025    Procedure Summary       Date: 03/24/25 Room / Location: [address]    Anesthesia Start: 1340 Anesthesia Stop: 1357    Procedure: ESOPHAGOGASTRODUODENOSCOPY (Esophagus) Diagnosis:       Gastrointestinal hemorrhage, unspecified gastrointestinal hemorrhage type      (Gastrointestinal hemorrhage, unspecified gastrointestinal hemorrhage type [K92.2])    Surgeons: Js Villalobos MD Responsible Provider: Zhao Remy DO    Anesthesia Type: general, TIVA ASA Status: 3            Anesthesia Type: No value filed.    Rush Phase I: Rush Score: 9    Rush Phase II:      Anesthesia Post Evaluation    Patient location during evaluation: PACU  Patient participation: complete - patient participated  Level of consciousness: awake and alert  Airway patency: patent  Nausea & Vomiting: no nausea and no vomiting  Cardiovascular status: hemodynamically stable  Respiratory status: acceptable  Hydration status: stable  Pain management: adequate    No notable events documented.

## 2025-03-24 NOTE — PLAN OF CARE
[facility]  Office: [phone]  Sunday Bello DO, Miguel A Quinn DO, Rodolfo Parr DO, Colton Yepez DO, Earl Hu MD, Aye Saavedra MD, Silverio Oshea MD, Leydi Barnett MD,  Albino Landis MD, Maribell Emery MD, Ankit Corral MD,  Jessica Peoples DO, Patria Echevarria MD, Renzo Nobles MD, Js Bello DO, Joselyn Richards MD,  Perfecto Younger DO, Sierra Peck MD, Jazz Burris MD, Karthikeyan Guardado MD,  Bubba Ferrera MD, Mary Chapa MD, Jossie Landers MD, Sal Celestin MD, Gonzales Bravo MD, Kaykay Barakat MD, Mike Acosta DO, Malik Jeffery MD, Jessica Frederick MD, Mohsin Reza, MD, Shirley Waterhouse, CNP,  Mami Guillen, CNP, Mike Collins, CNP,  Monae Anderson, DNP, Belia Au, CNP, Tanya Wilson, CNP, Ml Lee, CNP, Carol Saab, CNP, Hanane Wild PA-C, Adrianne Humphrey, CNP, Brenda Fuentes, CNP,  Radha Bright, CNP, Maki Holly, CNP, Taryn Valenzuela, CNP,  Shaye Verde, CNS, Pamela Fulton, CNP, Iman Phoenix, CNP,   Marilia Ferguson, CNP         [facility]   IN-PATIENT SERVICE   [facility]    Second Visit Note  For more detailed information please refer to the progress note of the day      3/24/2025    3:22 PM    Name:   Nicholas Martinez  MRN:     [id number]     Acct:      [id number]   Room:   1007/1007-02   Day:  2  Admit Date:  3/21/2025 10:28 PM    PCP:   Ysabel Dillard APRN - NP  Code Status:  Full Code      Pt vitals were reviewed   New labs were reviewed   Patient was seen    Updated plan :     Spoke to GI after egd: duodenal mass is slowly actively bleeding therefore will get inpatient surgery consult and continue to monitor h/h        Colton Yepez DO  3/24/2025  3:22 PM

## 2025-03-25 ENCOUNTER — HOSPITAL ENCOUNTER (INPATIENT)
Age: 25
LOS: 1 days | Discharge: HOME | DRG: 241 | End: 2025-03-26
Attending: INTERNAL MEDICINE | Admitting: INTERNAL MEDICINE
Payer: COMMERCIAL

## 2025-03-25 VITALS
DIASTOLIC BLOOD PRESSURE: 54 MMHG | WEIGHT: 187.39 LBS | HEART RATE: 87 BPM | HEIGHT: 66 IN | SYSTOLIC BLOOD PRESSURE: 128 MMHG | RESPIRATION RATE: 16 BRPM | TEMPERATURE: 97.9 F | BODY MASS INDEX: 30.12 KG/M2 | OXYGEN SATURATION: 100 %

## 2025-03-25 PROBLEM — D64.9 ANEMIA: Status: ACTIVE | Noted: 2025-03-25

## 2025-03-25 LAB
GLUCOSE BLD-MCNC: 101 MG/DL (ref 75–110)
GLUCOSE BLD-MCNC: 114 MG/DL (ref 75–110)
GLUCOSE BLD-MCNC: 94 MG/DL (ref 75–110)
GLUCOSE BLD-MCNC: 96 MG/DL (ref 75–110)
HCT VFR BLD AUTO: 26.6 % (ref 40.7–50.3)
HCT VFR BLD AUTO: 27.5 % (ref 40.7–50.3)
HCT VFR BLD AUTO: 28 % (ref 40.7–50.3)
HCT VFR BLD AUTO: 29.5 % (ref 40.7–50.3)
HGB BLD-MCNC: 7.5 G/DL (ref 13–17)
HGB BLD-MCNC: 7.7 G/DL (ref 13–17)
HGB BLD-MCNC: 7.8 G/DL (ref 13–17)
HGB BLD-MCNC: 7.8 G/DL (ref 13–17)

## 2025-03-25 PROCEDURE — 82947 ASSAY GLUCOSE BLOOD QUANT: CPT

## 2025-03-25 PROCEDURE — 6370000000 HC RX 637 (ALT 250 FOR IP): Performed by: INTERNAL MEDICINE

## 2025-03-25 PROCEDURE — 36415 COLL VENOUS BLD VENIPUNCTURE: CPT

## 2025-03-25 PROCEDURE — 85018 HEMOGLOBIN: CPT

## 2025-03-25 PROCEDURE — 85014 HEMATOCRIT: CPT

## 2025-03-25 PROCEDURE — 1200000000 HC SEMI PRIVATE

## 2025-03-25 PROCEDURE — 99232 SBSQ HOSP IP/OBS MODERATE 35: CPT | Performed by: INTERNAL MEDICINE

## 2025-03-25 PROCEDURE — 2580000003 HC RX 258: Performed by: INTERNAL MEDICINE

## 2025-03-25 PROCEDURE — 6360000002 HC RX W HCPCS: Performed by: INTERNAL MEDICINE

## 2025-03-25 PROCEDURE — 2500000003 HC RX 250 WO HCPCS: Performed by: INTERNAL MEDICINE

## 2025-03-25 RX ORDER — SODIUM CHLORIDE 9 MG/ML
INJECTION, SOLUTION INTRAVENOUS PRN
Status: DISCONTINUED | OUTPATIENT
Start: 2025-03-25 | End: 2025-03-26 | Stop reason: HOSPADM

## 2025-03-25 RX ORDER — SODIUM CHLORIDE 9 MG/ML
INJECTION, SOLUTION INTRAVENOUS PRN
Status: CANCELLED | OUTPATIENT
Start: 2025-03-25

## 2025-03-25 RX ORDER — ACETAMINOPHEN 650 MG/1
650 SUPPOSITORY RECTAL EVERY 6 HOURS PRN
Status: CANCELLED | OUTPATIENT
Start: 2025-03-25

## 2025-03-25 RX ORDER — SODIUM CHLORIDE 0.9 % (FLUSH) 0.9 %
5-40 SYRINGE (ML) INJECTION PRN
Status: CANCELLED | OUTPATIENT
Start: 2025-03-25

## 2025-03-25 RX ORDER — ONDANSETRON 2 MG/ML
4 INJECTION INTRAMUSCULAR; INTRAVENOUS EVERY 6 HOURS PRN
Status: CANCELLED | OUTPATIENT
Start: 2025-03-25

## 2025-03-25 RX ORDER — SODIUM CHLORIDE 0.9 % (FLUSH) 0.9 %
5-40 SYRINGE (ML) INJECTION PRN
Status: DISCONTINUED | OUTPATIENT
Start: 2025-03-25 | End: 2025-03-26 | Stop reason: HOSPADM

## 2025-03-25 RX ORDER — SODIUM CHLORIDE 0.9 % (FLUSH) 0.9 %
10 SYRINGE (ML) INJECTION PRN
Status: CANCELLED | OUTPATIENT
Start: 2025-03-25

## 2025-03-25 RX ORDER — MORPHINE SULFATE 2 MG/ML
1 INJECTION, SOLUTION INTRAMUSCULAR; INTRAVENOUS EVERY 4 HOURS PRN
Refills: 0 | Status: CANCELLED | OUTPATIENT
Start: 2025-03-25

## 2025-03-25 RX ORDER — ACETAMINOPHEN 325 MG/1
650 TABLET ORAL EVERY 6 HOURS PRN
Status: CANCELLED | OUTPATIENT
Start: 2025-03-25

## 2025-03-25 RX ORDER — ACETAMINOPHEN 325 MG/1
650 TABLET ORAL EVERY 6 HOURS PRN
Status: DISCONTINUED | OUTPATIENT
Start: 2025-03-25 | End: 2025-03-26 | Stop reason: HOSPADM

## 2025-03-25 RX ORDER — SODIUM CHLORIDE 0.9 % (FLUSH) 0.9 %
5-40 SYRINGE (ML) INJECTION EVERY 12 HOURS SCHEDULED
Status: DISCONTINUED | OUTPATIENT
Start: 2025-03-26 | End: 2025-03-26 | Stop reason: HOSPADM

## 2025-03-25 RX ORDER — ONDANSETRON 4 MG/1
4 TABLET, ORALLY DISINTEGRATING ORAL EVERY 8 HOURS PRN
Status: CANCELLED | OUTPATIENT
Start: 2025-03-25

## 2025-03-25 RX ORDER — ONDANSETRON 4 MG/1
4 TABLET, ORALLY DISINTEGRATING ORAL EVERY 8 HOURS PRN
Status: DISCONTINUED | OUTPATIENT
Start: 2025-03-25 | End: 2025-03-26 | Stop reason: HOSPADM

## 2025-03-25 RX ORDER — SODIUM CHLORIDE 0.9 % (FLUSH) 0.9 %
10 SYRINGE (ML) INJECTION PRN
Status: DISCONTINUED | OUTPATIENT
Start: 2025-03-25 | End: 2025-03-26 | Stop reason: HOSPADM

## 2025-03-25 RX ORDER — SODIUM CHLORIDE 0.9 % (FLUSH) 0.9 %
5-40 SYRINGE (ML) INJECTION EVERY 12 HOURS SCHEDULED
Status: CANCELLED | OUTPATIENT
Start: 2025-03-25

## 2025-03-25 RX ORDER — MORPHINE SULFATE 2 MG/ML
1 INJECTION, SOLUTION INTRAMUSCULAR; INTRAVENOUS EVERY 4 HOURS PRN
Refills: 0 | Status: DISCONTINUED | OUTPATIENT
Start: 2025-03-25 | End: 2025-03-26 | Stop reason: HOSPADM

## 2025-03-25 RX ORDER — ACETAMINOPHEN 650 MG/1
650 SUPPOSITORY RECTAL EVERY 6 HOURS PRN
Status: DISCONTINUED | OUTPATIENT
Start: 2025-03-25 | End: 2025-03-26 | Stop reason: HOSPADM

## 2025-03-25 RX ORDER — ONDANSETRON 2 MG/ML
4 INJECTION INTRAMUSCULAR; INTRAVENOUS EVERY 6 HOURS PRN
Status: DISCONTINUED | OUTPATIENT
Start: 2025-03-25 | End: 2025-03-26 | Stop reason: HOSPADM

## 2025-03-25 RX ADMIN — POLYETHYLENE GLYCOL 400 AND PROPYLENE GLYCOL 1 DROP: 4; 3 SOLUTION/ DROPS OPHTHALMIC at 13:23

## 2025-03-25 RX ADMIN — POLYETHYLENE GLYCOL 400 AND PROPYLENE GLYCOL 1 DROP: 4; 3 SOLUTION/ DROPS OPHTHALMIC at 07:03

## 2025-03-25 RX ADMIN — SODIUM CHLORIDE, PRESERVATIVE FREE 10 ML: 5 INJECTION INTRAVENOUS at 09:10

## 2025-03-25 RX ADMIN — POLYETHYLENE GLYCOL 400 AND PROPYLENE GLYCOL 1 DROP: 4; 3 SOLUTION/ DROPS OPHTHALMIC at 09:09

## 2025-03-25 RX ADMIN — SODIUM CHLORIDE, PRESERVATIVE FREE 40 MG: 5 INJECTION INTRAVENOUS at 18:35

## 2025-03-25 RX ADMIN — ACETAMINOPHEN 650 MG: 325 TABLET ORAL at 23:08

## 2025-03-25 RX ADMIN — SODIUM CHLORIDE, PRESERVATIVE FREE 40 MG: 5 INJECTION INTRAVENOUS at 03:56

## 2025-03-25 ASSESSMENT — PAIN SCALES - GENERAL
PAINLEVEL_OUTOF10: 2
PAINLEVEL_OUTOF10: 3

## 2025-03-25 NOTE — DISCHARGE SUMMARY
[facility]  Office: [phone]  Sunday Bello DO, Miguel A Quinn DO, Rodolfo Parr DO, Colton Yepez DO, Earl Hu MD, Aye Saavedra MD, Silverio Oshea MD, Leydi Barnett MD,  Albino Landis MD, Maribell Emery MD, Ankit Corral MD,  Jessica Peoples DO, Patria Echevarria MD, Renzo Nobles MD, Js Bello DO, Joselyn Richards MD,  Perfecto Younger DO, Sierra Peck MD, Jazz Burris MD, Karthikeyan Guardado MD,  Bubba Ferrera MD, Mary Chapa MD, Jossie Landers MD, Sal Celestin MD, Gonzales Bravo MD, Kaykay Barakat MD, Mike Acosta DO, Malik Jeffery MD, Jessica Frederick MD, Mohsin Reza, MD, Shirley Waterhouse, CNP,  Mami Guillen, CNP, Mike Collins, CNP,  Monae Anderson, DNP, Belia Au, CNP, Tanya Wilson, CNP, Ml Lee, CNP, Carol Saab, CNP, Hanane Wild PA-C, Adrianne Humphrey, CNP, Brenda Fuentes, CNP,  Radha Bright, CNP, Maki Holly, CNP, Taryn Valenzuela, CNP,  Shaye Verde, CNS, Pamela Fulton, CNP, Iman Phoenix, CNP,   Marilia Ferguson, CNP         [facility]   IN-PATIENT SERVICE   [facility]    Discharge Summary     Patient ID: Nicholas Martinez  :  2000   MRN: [id number]     ACCOUNT:  [id number]   Patient's PCP: Ysabel Dillard APRN - NP  Admit Date: 3/21/2025   Discharge Date: 3/25/2025     Length of Stay: 3  Code Status:  Full Code  Admitting Physician: No admitting provider for patient encounter.  Discharge Physician: Earl Hu MD     Active Discharge Diagnoses:     Hospital Problem Lists:  Active Problems:    Upper GI bleed    Asperger syndrome    Anemia due to acute blood loss    Duodenal mass    Anemia  Resolved Problems:    * No resolved hospital problems. *      Admission Condition:  poor     Discharged Condition: fair    Hospital Stay:   Admitting history;  Nicholas Martinez is a 25 y.o. Non- / non  male who presents with Headache (Patient states he has the headache. Patient states the headache has

## 2025-03-25 NOTE — CONSULTS
Department of General Surgery - Adult  Surgical Service [facility]  Attending Consult Note      Reason for Consult: Mass in second part of duodenum  Requesting Physician: Dr. Js Villalobos    CHIEF COMPLAINT: Anemia due to chronic GI bleed    History Obtained From:  patient    HISTORY OF PRESENT ILLNESS:                The patient is a 25 y.o. male who presents with severe anemia, with a hemoglobin of 4.0 three days ago.  He has been transfused with 3 units of blood, bringing the hemoglobin up to 8.1 and it has remained stable at about 7.7 today.  The patient underwent an EGD last year and a 2 cm mass was noted in the second part of the duodenum with slow oozing.  Biopsies did not reveal any malignancy.  A repeat EGD yesterday showed that the mass had grown to about 4 to 5 cm in size and was slowly oozing blood.  It was sessile.  More biopsies were obtained and the proximal margin was tattooed.  It was deemed too large to be removed by a hot snare.  General surgery service has been consulted for possible surgical excision.    The patient denied any visible blood per rectum, passing brown-colored stools.  No history of hematemesis or melena.  No abdominal pain.  He does not take any anticoagulants.  He does not feel dizzy or lightheaded.    Past Medical History:        Diagnosis Date    Acid reflux     Anemia     Anemia     Asperger syndrome     Asthma     As a child, resolved now.    Autism     Upper GI bleed      Past Surgical History:        Procedure Laterality Date    UPPER GASTROINTESTINAL ENDOSCOPY N/A 1/23/2024    EGD BIOPSY performed by Josiah Del Toro MD at [facility] OR    UPPER GASTROINTESTINAL ENDOSCOPY N/A 2/14/2024    EGD  ENTEROSCOPY PUSH DIAGNOSTIC FORCEP BIOPSY, EPINEPHRINE INJECTION, CAUTERY, CLIPPED performed by Josiah Del Toro MD at [facility] OR    UPPER GASTROINTESTINAL ENDOSCOPY N/A 3/24/2025    ESOPHAGOGASTRODUODENOSCOPY BIOPSY performed by Js Villalobos MD at [facility] OR    UPPER

## 2025-03-25 NOTE — PLAN OF CARE
Pt had no acute events overnight. CT ABD with IV contrast completed overnight- see results. PRN tylenol given once for ABD. Patient having pain on their abdomen and rates it a 3. Pain interventions includerelaxation techniques, medication, and regular rest periods. Patients goal for pain relief is  0 . The need for pain and symptom management will be considered in the discharge planning process to ensure patients comfort. Awaiting input from general surgery. Vitals stable and safety maintained.     Problem: Gastrointestinal - Adult  Goal: Minimal or absence of nausea and vomiting  Outcome: Progressing  Goal: Maintains or returns to baseline bowel function  Outcome: Progressing  Goal: Maintains adequate nutritional intake  Outcome: Progressing     Problem: Hematologic - Adult  Goal: Maintains hematologic stability  Outcome: Progressing     Problem: Discharge Planning  Goal: Discharge to home or other facility with appropriate resources  Outcome: Progressing

## 2025-03-25 NOTE — PLAN OF CARE
Patient admitted 3/24   EGD with biopsy yesterday. Findings \"In the distal duodenum, beyond the papilla, was a sessile polypoid lesion approximately 4 to 5 cm in greatest dimension.  It was actively oozing blood.\"  Surgical consult with  today.  would like  to evaluate or refer to a hepatal pancreaticobiliary surgeon   Trending Hbgs Q6    1616:  Call to [facility] to start transfer to [facility] per      Problem: Discharge Planning  Goal: Discharge to home or other facility with appropriate resources  3/25/2025 0931 by Ree Newton RN  Outcome: Progressing     Problem: Pain  Goal: Verbalizes/displays adequate comfort level or baseline comfort level  3/25/2025 0931 by Ree Newton RN  Outcome: Progressing  No c/o pain      Problem: Cardiovascular - Adult  Goal: Maintains optimal cardiac output and hemodynamic stability  3/25/2025 0931 by Ree Newton RN  Outcome: Progressing     Problem: Cardiovascular - Adult  Goal: Absence of cardiac dysrhythmias or at baseline  3/25/2025 0931 by Ree Newton RN  Outcome: Progressing     Problem: Gastrointestinal - Adult  Goal: Minimal or absence of nausea and vomiting  3/25/2025 0931 by Ree Newton RN  Outcome: Progressing     Problem: Gastrointestinal - Adult  Goal: Maintains or returns to baseline bowel function  3/25/2025 0931 by Ree Newton RN  Outcome: Progressing     Problem: Gastrointestinal - Adult  Goal: Maintains adequate nutritional intake  3/25/2025 0931 by Ree Newton RN  Outcome: Progressing     Problem: Infection - Adult  Goal: Absence of infection at discharge  3/25/2025 0931 by Ree Newton RN  Outcome: Progressing     Problem: Metabolic/Fluid and Electrolytes - Adult  Goal: Electrolytes maintained within normal limits  3/25/2025 0931 by Ree Newton RN  Outcome: Progressing     Problem: Metabolic/Fluid and Electrolytes - Adult  Goal: Hemodynamic stability and optimal renal function maintained  3/25/2025 0931 by

## 2025-03-25 NOTE — CARE COORDINATION
Discharge planning    Chart reviewed. Lives with mother in an apt. No DME. Independent.     Admitted with GI bleed, scope completed and  duodenal mass is slowly actively bleeding and consult to general surgery.     Will follow their POC

## 2025-03-26 ENCOUNTER — TELEPHONE (OUTPATIENT)
Dept: FAMILY MEDICINE CLINIC | Age: 25
End: 2025-03-26

## 2025-03-26 VITALS
HEIGHT: 66 IN | RESPIRATION RATE: 16 BRPM | WEIGHT: 186.29 LBS | TEMPERATURE: 98.2 F | BODY MASS INDEX: 29.94 KG/M2 | DIASTOLIC BLOOD PRESSURE: 65 MMHG | OXYGEN SATURATION: 100 % | HEART RATE: 76 BPM | SYSTOLIC BLOOD PRESSURE: 139 MMHG

## 2025-03-26 PROBLEM — K26.4: Status: ACTIVE | Noted: 2025-03-26

## 2025-03-26 LAB
HCT VFR BLD AUTO: 27.1 % (ref 40.7–50.3)
HCT VFR BLD AUTO: 28.7 % (ref 40.7–50.3)
HGB BLD-MCNC: 7.2 G/DL (ref 13–17)
HGB BLD-MCNC: 7.8 G/DL (ref 13–17)

## 2025-03-26 PROCEDURE — 36415 COLL VENOUS BLD VENIPUNCTURE: CPT

## 2025-03-26 PROCEDURE — 99221 1ST HOSP IP/OBS SF/LOW 40: CPT | Performed by: INTERNAL MEDICINE

## 2025-03-26 PROCEDURE — 85018 HEMOGLOBIN: CPT

## 2025-03-26 PROCEDURE — 6360000002 HC RX W HCPCS: Performed by: INTERNAL MEDICINE

## 2025-03-26 PROCEDURE — 2580000003 HC RX 258: Performed by: INTERNAL MEDICINE

## 2025-03-26 PROCEDURE — 2500000003 HC RX 250 WO HCPCS: Performed by: INTERNAL MEDICINE

## 2025-03-26 PROCEDURE — 85014 HEMATOCRIT: CPT

## 2025-03-26 RX ORDER — PANTOPRAZOLE SODIUM 40 MG/1
40 TABLET, DELAYED RELEASE ORAL
Status: DISCONTINUED | OUTPATIENT
Start: 2025-03-26 | End: 2025-03-26 | Stop reason: HOSPADM

## 2025-03-26 RX ORDER — PANTOPRAZOLE SODIUM 40 MG/1
40 TABLET, DELAYED RELEASE ORAL
Qty: 60 TABLET | Refills: 3 | Status: SHIPPED | OUTPATIENT
Start: 2025-03-26

## 2025-03-26 RX ADMIN — SODIUM CHLORIDE 40 MG: 9 INJECTION INTRAMUSCULAR; INTRAVENOUS; SUBCUTANEOUS at 03:20

## 2025-03-26 RX ADMIN — SODIUM CHLORIDE, PRESERVATIVE FREE 10 ML: 5 INJECTION INTRAVENOUS at 10:43

## 2025-03-26 RX ADMIN — SODIUM CHLORIDE, PRESERVATIVE FREE 10 ML: 5 INJECTION INTRAVENOUS at 03:21

## 2025-03-26 ASSESSMENT — PAIN SCALES - GENERAL
PAINLEVEL_OUTOF10: 0
PAINLEVEL_OUTOF10: 0

## 2025-03-26 NOTE — TELEPHONE ENCOUNTER
Care Transitions Initial Follow Up Call    Outreach made within 2 business days of discharge: Yes    Patient: Nicholas Martinez Patient : 2000   MRN: [id number]  Reason for Admission: upper GI bleed  Discharge Date: 3/25/25       Spoke with:     Discharge department/facility: [facility] Interactive Patient Contact:  Was patient able to fill all prescriptions:   Was patient instructed to bring all medications to the follow-up visit:   Is patient taking all medications as directed in the discharge summary?   Does patient understand their discharge instructions:   Does patient have questions or concerns that need addressed prior to 7-14 day follow up office visit:     Additional needs identified to be addressed with provider  Patient was transferred to [facility] and is currently admitted.             Scheduled appointment with PCP within 7-14 days    Follow Up  No future appointments.    Corinne Funes MA

## 2025-03-26 NOTE — PROGRESS NOTES
Physical Therapy        Physical Therapy Cancel Note      DATE: 3/26/2025    NAME: Nicholas Martinez  MRN: [id number]   : 2000      Patient not seen this date for Physical Therapy due to:    Patient independent with functional mobility. Will defer PT evaluation at this time. Please reorder PT if future needs arise.       Electronically signed by Eze Campuzano PT on 3/26/2025 at 8:21 AM

## 2025-03-26 NOTE — DISCHARGE SUMMARY
[facility]  Office: [phone]  Sunday Bello DO, Miguel A Quinn DO, Rodolfo Parr DO, Colton Yepez DO, Earl Hu MD, Aye Saavedra MD, Silverio Oshea MD, Leydi Barnett MD,  Albino Landis MD, Maribell Emery MD, Ankit Corral MD,  Jessica Peoples DO, Patria Echevarria MD, Renzo Nobles MD, Js Bello DO, Joselyn Richards MD,  Perfecto Younger DO, Sierra Peck MD, Jazz Burris MD, Karthikeyan Guardado MD,  Bubba Ferrera MD, Mary Chapa MD, Jossie Landers MD, Sal Celestin MD, Gonzales Bravo MD, Kaykay Barakat MD, Mike Acosta DO, Malik Jeffery MD, Jessica Frederick MD, Mohsin Reza, MD, Shirley Waterhouse, CNP,  Mami Guillen, CNP, Mike Collins, CNP,  Monae Anderson, DNP, Belia Au, CNP, Tanya Wilson, CNP, Ml Lee, CNP, Carol Saab, CNP, Hanane Wild PA-C, Adrianne Humphrey, CNP, Brenda Fuentes, CNP,  Radha Bright, CNP, Maki Holly, CNP, Taryn Valenzuela, CNP,  Shaye Verde, CNS, Pamela Fulton, CNP, Iman Phoenix, CNP,   Marilia Ferguson, CNP         [facility]   IN-PATIENT SERVICE   [facility]    Discharge Summary     Patient ID: Nicholas Martinez  :  2000   MRN: [id number]     ACCOUNT:  [id number]   Patient's PCP: Ysabel Dilalrd APRN - NP  Admit Date: 3/25/2025   Discharge Date: 3/26/2025     Length of Stay: 1  Code Status:  Full Code  Admitting Physician: Colton Yepez DO  Discharge Physician: Colton Yepez DO     Active Discharge Diagnoses:     Hospital Problem Lists:  Principal Problem:    Chronic duodenal ulcer with bleeding  Active Problems:    Upper GI bleed    Asperger syndrome    Anemia due to acute blood loss  Resolved Problems:    * No resolved hospital problems. *      Admission Condition:  fair     Discharged Condition: stable    Hospital Stay:     Hospital Course:  Nicholas Martinez is a 25 y.o. male who was admitted for the management of   Chronic duodenal ulcer with bleeding , presented to ER with No

## 2025-03-26 NOTE — PROGRESS NOTES
Pt arrived from [facility], primary team notified of his arrival a little after midnight to be seen.

## 2025-03-26 NOTE — PROGRESS NOTES
GASTROENTEROLOGY NOTE       Patient:   Nicholas Martinez   :    2000   Facility:   [facility]  Date:     3/23/2025  Consultant:   XANDER Navas CNP      SUBJECTIVE:      Resting in bed, without complaints this morning.  Denies nausea, vomiting, or abdominal pain.  He remains agreeable to an EGD tomorrow.    OBJECTIVE:   Vital Signs:  BP (!) 143/82   Pulse 72   Temp 98.1 °F (36.7 °C) (Oral)   Resp 14   Ht 1.676 m (5' 6\")   Wt 85 kg (187 lb 6.3 oz)   SpO2 100%   BMI 30.25 kg/m²      Physical Exam:   General appearance: Alert, NAD  Lungs: CTA bilaterally, unlabored pattern  Heart: S1S2, RRR without murmur  Abdomen: Soft, NT, ND +BS  Skin/Musculoskeletal:  No jaundice. Pale.  ROM normal.      Lab and Imaging Review   Recent Labs     25  2247 25  0116 25  0307 25  0825 25  1629 25  2304 25  0412   WBC  --  6.7  --  8.6  --   --  8.9   HGB  --  4.0*  --  5.7* 6.7* 7.8* 7.5*   MCV  --  57.7*  --  61.2*  --   --  67.7*   PLT  --  275  --  296  --   --  246   INR  --   --  1.2  --   --   --   --      --   --  144  --   --  139   K 4.0  --   --  4.6  --   --  4.5     --   --  113*  --   --  108*   CO2 21  --   --  18*  --   --  19*   BUN 14  --   --  12  --   --  13   CREATININE 0.9  --   --  0.7  --   --  0.8   GLUCOSE 108*  --   --  136*  --   --  116*   CALCIUM 8.8  --   --  9.1  --   --  8.6   AST  --   --  14  --   --   --   --    ALT  --   --  14  --   --   --   --    ALKPHOS  --   --  69  --   --   --   --    BILITOT  --   --  0.3  --   --   --   --    BILIDIR  --   --  0.2  --   --   --   --    MG 2.0  --   --   --   --   --   --      Recent Labs     25  0307   INR 1.2   PROTIME 16.1*         Impression:    Anemia  Recurrent GIB  Hx of duodenal mass      PLAN:    Continue with twice daily PPI  Monitor H&H, transfuse as needed  NPO at midnight  EGD Monday  Anticipate eventual EMR vs surgical intervention      Madeline 
End Of Shift Note  [facility]  Summary of shift: Patient alert and oriented to name, , place, time and situation. Patient did complain of chronic headache and lower back pain and was treated with oral tylenol with some relief of pain. Patient able to ambulate to bathroom with out assistance, denied any dizziness, but Heart rate did elevate to 120's with ambulation. BP was elevated in AM ranging in 150-160 SBP. One time dose of Metoprolol 5 mg given and SBP have ranged in 130-140's rest of shift. Patient continued to run low grade fevers of 99 degrees orally most of shift. GI consulted and saw patient plan to continue to infuse PRBC's as needed and plan for EGD Monday. No overt signs of blood loss noted. Patient had 6 continent bowel movements that were small and formed and brown. Patient has increase frequency of urine with small urine production, around 100-200 ml each urination. Diet was advanced to Regular diet and patient ate 100% of lunch and dinner and is drinking well. Patient hemoglobin was 4.0 upon admission, and he received 1 unit of PRBC's, recheck Hemoglobin was 5.7. 2nd unit of blood was adminstered and recheck hemoglobin was 6.7. So a 3 rd unit of PRBC's started and was infusing at end of shift. Tolerating blood transfusion well. Lung sounds remain clear.    Vitals:    Vitals:    22 0351 22 0400 22 0500 22 0600   BP:  127/79 117/75 127/74   Pulse: 82 82 82 78   Resp: 17 17 20 28   Temp:  97.3 °F (36.3 °C)     TempSrc:  Temporal     SpO2: 95% 94% 91% 95%   Weight:       Height:            I&O:     Intake/Output Summary (Last 24 hours) at 2022 0713  Last data filed at 2022 0600  Gross per 24 hour   Intake 2107.03 ml   Output 3402.3 ml   Net -1295.27 ml       Resp Status: Room air, oxygen saturation remained in upper 90's to 100% on room air all shift.     Ventilator Settings:     / / /FiO2 : 30 %    Critical Care IV infusions:   PN-Adult 2-in-1 Central Line 
Patient transferred to [facility]. Report given. Tele removed. All paperwork sent with patient. All questions answered.  
Pt admitted to 1007 from ICU. VSS, call light and end table in reach, bed in lowest position, tele applied. No needs at this time. Hemoglobin 7.5 this morning.   
Pt brought over to ICU. Upon moving pt over to bed RN noticed IV where blood was infusing was infiltrated. Pt skin around IV site was elevated. Pt otherwise asymptomatic. Blood immediately stopped and switched to an IV that was working. Infiltrated IV removed.  
Pt returned back from EGD, vss, pt denies any pain, Dr. Yepez messaged and informed that EGD was completed and that GI wants a surgery consult, Dr. Yepez notified Dr. Tena and pt informed and given clear liquid diet   
RN sent message to ANNETTE Ugarte NP on call for Dr. Del Toro, Gastroenterologist about new patient consult. not sure if you were contacted overnight so i am sending this new consult to be sure. New consult, patient arrived in ED last night complaining of headaches, dizziness, chills, fever. Hgb4.0, stool occult +, History of Upper GI bleed last year  that cauterized during EGD, has chronic anemia. has recieved 1 Unit of PRBC\"S so far, 2nd Unit ordered waiting on the 1hr post blood H&H to be drawn at 8am before infusing 2nd unit.   
RN sent message to Dr. Yepez about critical hemoglobin lab. Recheck hemoglobin 1.5 post 2nd unit of blood was Hgb-6.7, HCT- 24.6.Patient is still recieving NACL 0.9% at 100ml/hr and now on a regular diet and eating and drinking well. What would you like me to do?    Awaiting response/orders.   
RN spoke with ANTIONETTE Britt informed of Hemoglobin recheck of 5.7 after 1 unit of PRBC's. There is already an order for 1 more unit of PRBC's but we lost IV access. This RN attempt new IV 2 times and Yoanna RN attempted 2 times got blood return but IV's blew when advanced. Robel RN called to place Ultrasound IV's. 2 new IV's placed in Right arm using Ultrasound.   
[facility]  Office: [phone]  Sunday Bello DO, Miguel A Quinn DO, Rodolfo Parr DO, Colton Yepez DO, Earl Hu MD, Aye Saavedra MD, Silverio Oshea MD, Leydi Barnett MD,  Albino Landis MD, Maribell Emery MD, Ankit Corral MD,  Jessica Peoples DO, Patria Echevarria MD, Renzo Nobles MD, Js Bello DO, Joselyn Richards MD,  Perfecto Younger DO, Sierra Peck MD, Jazz Burris MD, Karthikeyan Guardado MD,  Bubba Ferrera MD, Mary Chapa MD, Jossie Landers MD, Sal Celestin MD, Gonzales Bravo MD, Kaykay Barakat MD, Mike Acosta DO, Malik Jeffery MD, Jessica Frederick MD, Mohsin Reza, MD, Shirley Waterhouse, CNP,  Mami Guillen, CNP, Mike Collins, CNP,  Monae Anderson, DNP, Belia Au, CNP, Tanya Wilson, CNP, Ml Lee, CNP, Carol Saab, CNP, KYLE Yang-C, Adrianne Humphrey, CNP, Brenda Fuentes, CNP,  Radha Bright, CNP, Maki Holly, CNP, Taryn Valenzuela, CNP,  Shaye Verde, CNS, Pamela Fulton, CNP, Iman Phoenix, CNP,   Marilia Ferguson, CNP         [facility]   IN-PATIENT SERVICE   [facility]    Progress Note    3/24/2025    9:07 AM    Name:   Nicholas Martinez  MRN:     [id number]     Acct:      [id number]   Room:   [address] [place] [address] Day:  2  Admit Date:  3/21/2025 10:28 PM    PCP:   Ysabel Dillard APRN - NP  Code Status:  Full Code    Subjective:     C/C:   Chief Complaint   Patient presents with    Headache     Patient states he has the headache. Patient states the headache has been constant for a few days. Patient states his eyes are dry with a bad smell stuck in his nose. Patient states he has had headache in the past that was relieved in the past with over the counter meds. Patient states he has also had chills and a little fever.     Dizziness     Interval History Status: improved.     Feels good  Denies c/o  No headache    Brief History:     Nicholas Martinez is a 25 y.o. Non- / non  male who presents with Headache 
[facility]  Office: [phone]  Sunday Bello DO, Miguel A Quinn DO, Rodolfo Parr DO, Colton Yepez DO, Earl Hu MD, Aye Saavedra MD, Silverio Oshea MD, Leydi Barnett MD,  Albino Landis MD, Maribell Emery MD, Ankit Corral MD,  Jessica Peoples DO, Patria Echevarria MD, Renzo Nobles MD, Js Bello DO, Joselyn Richards MD,  Perfecto Younger DO, Sierra Peck MD, Jazz Burris MD, Karthikeyan Guardado MD,  Bubba Ferrera MD, Mary Chapa MD, Jossie Landers MD, Sal Celestin MD, Gonzales Bravo MD, Kaykay Barakat MD, Mike Acosta DO, Malik Jeffery MD, Jessica Frederick MD, Mohsin Reza, MD, Shirley Waterhouse, CNP,  Mami Guillen, CNP, Mike Collins, CNP,  Monae Anderson, DNP, Belia Au, CNP, Tanya Wilson, CNP, Ml Lee, CNP, Carol Saab, CNP, Hanane Wild PA-C, Adrianne Humphrey, CNP, Brenda Fuentes, CNP,  Radha Bright, CNP, Maki Holly, CNP, Taryn Valenzuela, CNP,  Shaye Verde, CNS, Pamela Fulton, CNP, Iman Phoenix, CNP,   Marilia Ferguson, CNP         [facility]   IN-PATIENT SERVICE   [facility]    Progress Note    3/25/2025    12:55 PM    Name:   Nicholas Martinez  MRN:     [id number]     Acct:      [id number]   Room:   [address] [place] [address] Day:  3  Admit Date:  3/21/2025 10:28 PM    PCP:   Ysabel Dillard APRN - NP  Code Status:  Full Code    Subjective:     C/C:   Chief Complaint   Patient presents with    Headache     Patient states he has the headache. Patient states the headache has been constant for a few days. Patient states his eyes are dry with a bad smell stuck in his nose. Patient states he has had headache in the past that was relieved in the past with over the counter meds. Patient states he has also had chills and a little fever.     Dizziness     Interval History Status: .   The patient underwent an EGD last year and a 2 cm mass was noted in the second part of the duodenum with slow oozing. Biopsies did not reveal any malignancy. A 
    Vs will discuss with Dr. Cleveland this afternoon, if he want to pursue EMR. If Dr. Cleveland wants to perform EMR can remain at [facility]     This plan was formulated in collaboration with TOPHER Galindo  1:08 PM  3/25/2025  
CONTRAST  Result Date: 3/21/2025  No acute intracranial abnormality.       Physical Examination:        General appearance:  alert, cooperative and no distress  Mental Status:  oriented to person, place and time and normal affect  Lungs:  clear to auscultation bilaterally, normal effort  Heart:  regular rate and rhythm, no murmur  Abdomen:  soft, nontender, nondistended, normal bowel sounds, no masses, hepatomegaly, splenomegaly  Extremities:  no edema, redness, tenderness in the calves  Skin:  no gross lesions, rashes, induration    Assessment:        Hospital Problems           Last Modified POA    Upper GI bleed 8/26/2024 Yes    Asperger syndrome 3/22/2025 Yes    Anemia due to acute blood loss 3/22/2025 Yes    Duodenal mass 3/22/2025 Yes        Plan:        Monitor hgb, reduce frequency of checks, does not seem like he is actively bleeding  Npo at [place] for egd tomorrow  Based on that, referral for EMR vs surgery  Likely discharge post egd    Colton Yepez DO  3/23/2025  8:31 AM

## 2025-03-26 NOTE — PROGRESS NOTES
CLINICAL PHARMACY NOTE: MEDS TO BEDS    Total # of Prescriptions Filled: 1   The following medications were delivered to the patient:  pantoprazole    Additional Documentation:

## 2025-03-26 NOTE — CARE COORDINATION
03/26/25 0748   Readmission Assessment   Number of Days since last admission? 1-7 days  (Readmission N/A, transfer from [facility])   Previous Disposition Other (comment)  (N/A)   Who is being Interviewed Patient  (N/A)   What was the patient's/caregiver's perception as to why they think they needed to return back to the hospital? Other (Comment)  (N/A)   Did you visit your Primary Care Physician after you left the hospital, before you returned this time?   (N/A)   Did you see a specialist, such as Cardiac, Pulmonary, Orthopedic Physician, etc. after you left the hospital? Other (Comment)  (N/A)   Who advised the patient to return to the hospital? Other (Comment)  (N/A)   Does the patient report anything that got in the way of taking their medications?   (N/A)   In our efforts to provide the best possible care to you and others like you, can you think of anything that we could have done to help you after you left the hospital the first time, so that you might not have needed to return so soon? Other (Comment)  (N/A)

## 2025-03-26 NOTE — H&P
[facility]  Office: [phone]  Sunday Bello DO, Miguel A Quinn DO, Rodolfo Parr DO, Colton Yepez DO, Earl Hu MD, Aye Saavedra MD, Silverio Oshea MD, Leydi Barnett MD,  Albino Landis MD, Maribell Emery MD, Ankit Corral MD,  Jessica Peoples DO, Patria Echevarria MD, Renzo Nobles MD, Js Bello DO, Joselyn Richards MD,  Perfecto Younger DO, Sierra Peck MD, Jazz Burris MD, Karthikeyan Guardado MD,  Bubba Ferrera MD, Mary Chapa MD, Jossie Landers MD, Sal Celestin MD, Gonzales Bravo MD, Kaykay Barakat MD, Mike Acosta DO, Malik Jeffery MD, Jessica Frederick MD, Mohsin Reza, MD, Shirley Waterhouse, CNP,  Mami Guillen, CNP, Mike Collins, CNP,  Monae Anderson, DNP, Belia Au, CNP, Tanya Wilson, CNP, Ml Lee, CNP, Carol Saab, CNP, Hanane Wild PA-C, Adrianne Humphrey, CNP, Brenda Fuentes, CNP,  Radha Bright, CNP, Maki Holly, CNP, Taryn Valenzuela, CNP,  Shaye Verde, CNS, Pamela Fulton, CNP, Iman Phoenix, CNP,   Marilia Ferguson, CNP         [facility]   IN-PATIENT SERVICE   [facility]    HISTORY AND PHYSICAL EXAMINATION            Date:   3/26/2025  Patient name:  Nicholas Martinez  Date of admission:  3/25/2025 10:03 PM  MRN:   [id number]  Account:  [id number]  YOB: 2000  PCP:    Ysabel Dillard APRN - NP  Room:   [facility]0249-  Code Status:    Full Code    Chief Complaint:     Duodenal mass    History Obtained From:     patient, electronic medical record    History of Present Illness:     Nicholas Martinez is a 25 y.o. Non- / non  male who presents with No chief complaint on file.   and is admitted to the hospital for the management of Duodenal mass.    This 25 yom originally presented to [facility] with a headache and was found to have hgb of 4.  He was transfused several units of blood and had egd which showed persistent duodenal mass that was oozing blood.  He was seen there by gen surgery

## 2025-03-26 NOTE — PROGRESS NOTES
Occupational Therapy    [facility]  Occupational Therapy Not Seen Note    DATE: 3/26/2025    NAME: Nicholas Martinez  MRN: [id number]   : 2000      Patient not seen this date for Occupational Therapy due to:    Patient independent with ADLs and functional tasks with no acute OT needs. Will defer OT evaluation at this time. Please reorder OT if future needs arise.     Electronically signed by NICOLE FLORES on 3/26/2025 at 9:23 AM

## 2025-03-26 NOTE — H&P
Hepatobiliary Surgery Faculty Note    Date of Visit: 3/26/2025   Chief Complaint:  No chief complaint on file.   Headache/ anemia    Referring physician:   Earl Hu MD     HPI:  Nicholas Martinez is a 25 y.o. old male with a history of known duodenal ulcer.  He had a headache and has been taking IBU.  He has had no melena or hematochezia in the last day.  No abd pain, no wt loss.  No fever or chills.  He has been scoped multiple times with a ulceration noted, biopsy neg for ca.  Hpylori stain neg.  Currently on clears.        ALLERGIES:  No Known Allergies     CURRENT MEDICATIONS:  Current Facility-Administered Medications   Medication Dose Route Frequency Provider Last Rate Last Admin    pantoprazole (PROTONIX) tablet 40 mg  40 mg Oral BID AC Colton Yepez DO        0.9 % sodium chloride infusion   IntraVENous PRN Earl Hu MD        sodium chloride flush 0.9 % injection 5-40 mL  5-40 mL IntraVENous 2 times per day Earl Hu MD        sodium chloride flush 0.9 % injection 5-40 mL  5-40 mL IntraVENous PRN Earl Hu MD        0.9 % sodium chloride infusion   IntraVENous PRN Earl Hu MD        ondansetron (ZOFRAN-ODT) disintegrating tablet 4 mg  4 mg Oral Q8H PRN Earl Hu MD        Or    ondansetron (ZOFRAN) injection 4 mg  4 mg IntraVENous Q6H PRN Earl Hu MD        acetaminophen (TYLENOL) tablet 650 mg  650 mg Oral Q6H PRN Eral Hu MD   650 mg at 03/25/25 8738    Or    acetaminophen (TYLENOL) suppository 650 mg  650 mg Rectal Q6H PRN Earl Hu MD        morphine (PF) injection 1 mg  1 mg IntraVENous Q4H PRN Earl Hu MD        0.9 % sodium chloride infusion   IntraVENous PRN Earl Hu MD        hypromellose (ISOPTO TEARS) 0.5 % ophthalmic solution 1 drop  1 drop Both Eyes PRN Earl Hu MD        sodium chloride flush 0.9 % injection 10 mL  10 mL IntraVENous PRN Earl Hu MD   10 mL at 03/26/25 3885

## 2025-03-27 LAB — SURGICAL PATHOLOGY REPORT: NORMAL

## 2025-04-04 ENCOUNTER — OFFICE VISIT (OUTPATIENT)
Dept: FAMILY MEDICINE CLINIC | Age: 25
End: 2025-04-04
Payer: COMMERCIAL

## 2025-04-04 VITALS
HEART RATE: 71 BPM | OXYGEN SATURATION: 98 % | SYSTOLIC BLOOD PRESSURE: 136 MMHG | BODY MASS INDEX: 30.37 KG/M2 | RESPIRATION RATE: 18 BRPM | HEIGHT: 66 IN | WEIGHT: 189 LBS | DIASTOLIC BLOOD PRESSURE: 74 MMHG

## 2025-04-04 DIAGNOSIS — K92.2 GASTROINTESTINAL HEMORRHAGE, UNSPECIFIED GASTROINTESTINAL HEMORRHAGE TYPE: ICD-10-CM

## 2025-04-04 DIAGNOSIS — Z09 HOSPITAL DISCHARGE FOLLOW-UP: Primary | ICD-10-CM

## 2025-04-04 PROCEDURE — 99214 OFFICE O/P EST MOD 30 MIN: CPT | Performed by: NURSE PRACTITIONER

## 2025-04-04 PROCEDURE — 1111F DSCHRG MED/CURRENT MED MERGE: CPT | Performed by: NURSE PRACTITIONER

## 2025-04-04 ASSESSMENT — PATIENT HEALTH QUESTIONNAIRE - PHQ9
1. LITTLE INTEREST OR PLEASURE IN DOING THINGS: NOT AT ALL
SUM OF ALL RESPONSES TO PHQ QUESTIONS 1-9: 0
SUM OF ALL RESPONSES TO PHQ QUESTIONS 1-9: 0
2. FEELING DOWN, DEPRESSED OR HOPELESS: NOT AT ALL
SUM OF ALL RESPONSES TO PHQ QUESTIONS 1-9: 0
SUM OF ALL RESPONSES TO PHQ QUESTIONS 1-9: 0

## 2025-04-04 NOTE — PROGRESS NOTES
Post-Discharge Transitional Care Follow Up      Nicholas Martinez   YOB: 2000    Date of Office Visit:  4/4/2025  Date of Hospital Admission: 3/25/25  Date of Hospital Discharge: 3/26/25  Readmission Risk Score (high >=14%. Medium >=10%):Readmission Risk Score: 9.7      Care management risk score Rising risk (score 2-5) and Complex Care (Scores >=6): No Risk Score On File     Non face to face  following discharge, date last encounter closed (first attempt may have been earlier): 03/26/2025     Call initiated 2 business days of discharge: Yes     Hospital discharge follow-up  -     [place] DISCHARGE MEDS RECONCILED W/ CURRENT OUTPATIENT MED LIST  Gastrointestinal hemorrhage, unspecified gastrointestinal hemorrhage type  -     CBC with Auto Differential; Future      Assessment & Plan  1. Post-hospitalization follow-up.  - Hemoglobin levels were low upon discharge from the hospital.  - Records request signed today for Dr. Villalobos office. He was advised to schedule an appointment with general surgery.    - Advised to continue Protonix regimen and avoid medications such as ibuprofen, aspirin, and naproxen that could potentially exacerbate bleeding. A recheck of hemoglobin levels will be ordered to ensure they are not decreasing. Instructed to seek immediate medical attention if experiencing any symptoms similar to those prior to hospitalization.    2. Dry eyes.  - Reports that his eyes have been feeling dry for a while.  -  OTC Eye drops provide temporary relief.  - Recommended continuing the use of eye drops as needed and monitoring symptoms.      Medical Decision Making: high complexity  Return in about 3 months (around 7/4/2025) for 3 month follow up.               Subjective:     History of Present Illness      Presents for hospital follow up  Admitted to [facility] 03/22/2025 through 3/25/2025 and transferred to [place] 03/25/2025 - 03/26/2025  Driven to appointment by friend today      Admitting

## 2025-04-08 ENCOUNTER — TELEPHONE (OUTPATIENT)
Dept: FAMILY MEDICINE CLINIC | Age: 25
End: 2025-04-08

## 2025-04-08 NOTE — TELEPHONE ENCOUNTER
Per provider please get last office note from [facility].   Faxed request to their office, received that patient has not been in by them in office, just had EGD.     Left message for their office to return call. Patient could not really remember why he was referred to General Surgery from them

## 2025-04-09 ASSESSMENT — ENCOUNTER SYMPTOMS
COLOR CHANGE: 0
BACK PAIN: 0
VOMITING: 0
ABDOMINAL PAIN: 0
SINUS PAIN: 0
EYE PAIN: 0
DIARRHEA: 0
SINUS PRESSURE: 0
NAUSEA: 0
SHORTNESS OF BREATH: 0
CHEST TIGHTNESS: 0
CONSTIPATION: 0

## 2025-04-28 ENCOUNTER — TELEPHONE (OUTPATIENT)
Dept: ONCOLOGY | Age: 25
End: 2025-04-28

## 2025-04-28 ENCOUNTER — INITIAL CONSULT (OUTPATIENT)
Dept: ONCOLOGY | Age: 25
End: 2025-04-28
Payer: COMMERCIAL

## 2025-04-28 VITALS
BODY MASS INDEX: 30.62 KG/M2 | HEIGHT: 66 IN | TEMPERATURE: 98.5 F | SYSTOLIC BLOOD PRESSURE: 135 MMHG | DIASTOLIC BLOOD PRESSURE: 73 MMHG | RESPIRATION RATE: 16 BRPM | HEART RATE: 88 BPM | WEIGHT: 190.5 LBS

## 2025-04-28 DIAGNOSIS — K90.9 IRON MALABSORPTION: ICD-10-CM

## 2025-04-28 DIAGNOSIS — D50.0 IRON DEFICIENCY ANEMIA DUE TO CHRONIC BLOOD LOSS: Primary | ICD-10-CM

## 2025-04-28 PROBLEM — D50.9 IDA (IRON DEFICIENCY ANEMIA): Status: ACTIVE | Noted: 2025-04-28

## 2025-04-28 PROCEDURE — G8427 DOCREV CUR MEDS BY ELIG CLIN: HCPCS | Performed by: INTERNAL MEDICINE

## 2025-04-28 PROCEDURE — 99245 OFF/OP CONSLTJ NEW/EST HI 55: CPT | Performed by: INTERNAL MEDICINE

## 2025-04-28 PROCEDURE — G8417 CALC BMI ABV UP PARAM F/U: HCPCS | Performed by: INTERNAL MEDICINE

## 2025-04-28 PROCEDURE — 99202 OFFICE O/P NEW SF 15 MIN: CPT | Performed by: INTERNAL MEDICINE

## 2025-04-28 RX ORDER — SODIUM CHLORIDE 0.9 % (FLUSH) 0.9 %
5-40 SYRINGE (ML) INJECTION PRN
OUTPATIENT
Start: 2025-04-29

## 2025-04-28 RX ORDER — MEPERIDINE HYDROCHLORIDE 50 MG/ML
12.5 INJECTION INTRAMUSCULAR; INTRAVENOUS; SUBCUTANEOUS PRN
OUTPATIENT
Start: 2025-04-29

## 2025-04-28 RX ORDER — EPINEPHRINE 1 MG/ML
0.3 INJECTION, SOLUTION, CONCENTRATE INTRAVENOUS PRN
OUTPATIENT
Start: 2025-04-29

## 2025-04-28 RX ORDER — HEPARIN SODIUM (PORCINE) LOCK FLUSH IV SOLN 100 UNIT/ML 100 UNIT/ML
500 SOLUTION INTRAVENOUS PRN
OUTPATIENT
Start: 2025-04-29

## 2025-04-28 RX ORDER — ALBUTEROL SULFATE 90 UG/1
4 INHALANT RESPIRATORY (INHALATION) PRN
OUTPATIENT
Start: 2025-04-29

## 2025-04-28 RX ORDER — SODIUM CHLORIDE 9 MG/ML
5-250 INJECTION, SOLUTION INTRAVENOUS PRN
OUTPATIENT
Start: 2025-04-29

## 2025-04-28 RX ORDER — ONDANSETRON 2 MG/ML
8 INJECTION INTRAMUSCULAR; INTRAVENOUS
OUTPATIENT
Start: 2025-04-29

## 2025-04-28 RX ORDER — FAMOTIDINE 10 MG/ML
20 INJECTION, SOLUTION INTRAVENOUS
OUTPATIENT
Start: 2025-04-29

## 2025-04-28 RX ORDER — HYDROCORTISONE SODIUM SUCCINATE 100 MG/2ML
100 INJECTION INTRAMUSCULAR; INTRAVENOUS
OUTPATIENT
Start: 2025-04-29

## 2025-04-28 RX ORDER — ACETAMINOPHEN 325 MG/1
650 TABLET ORAL ONCE
OUTPATIENT
Start: 2025-04-29 | End: 2025-04-29

## 2025-04-28 RX ORDER — DIPHENHYDRAMINE HYDROCHLORIDE 50 MG/ML
50 INJECTION, SOLUTION INTRAMUSCULAR; INTRAVENOUS
OUTPATIENT
Start: 2025-04-29

## 2025-04-28 RX ORDER — ACETAMINOPHEN 325 MG/1
650 TABLET ORAL
OUTPATIENT
Start: 2025-04-29

## 2025-04-28 RX ORDER — SODIUM CHLORIDE 9 MG/ML
INJECTION, SOLUTION INTRAVENOUS CONTINUOUS
OUTPATIENT
Start: 2025-04-29

## 2025-04-28 NOTE — TELEPHONE ENCOUNTER
[facility] HERE FOR CONSULTATION  See orders for IV iron to be given As soon as they are approved. Please expedite as pt has surgery upcoming   Rv to see me in early June for pathology review   NEW ORDER IS PENDING ALTAGRACIA ( ASAP)  PT SURGERY IS SCHEDULED 5/8/25  MD VISIT 6/2/25 @2:30PM  AVS PRINTED W/ INSTRUCTIONS AND GIVEN TO PT ON EXIT

## 2025-04-28 NOTE — PROGRESS NOTES
DIAGNOSIS:   Neurofibromatosis  Upper GI bleeding leading to iron deficiency anemia  And duodenal mass concerning  CURRENT THERAPY:  Received supportive care and plan IV iron for iron deficiency  Plan removal of the duodenal mass  BRIEF CASE HISTORY:   Nicholas Martinez is a very pleasant 25 y.o. male who is referred to us for management recommendation regarding his duodenal mass.  He presented with upper GI bleeding and EGD showed a duodenal mass that was measuring 2 cm.  However, subsequent testing showed that the mass was enlarging and it was up to 4 cm.  Pathology was done endoscopically and was negative for malignancy however because of the growing mass, there was a suspicion of malignancy despite negative histology.  GI decided that the mass is too big to be removed by endoscopic snare and I recommended referral to hepatobiliary surgeon.  Because of her neurofibromatosis the index of suspicion for malignancy is fairly high even with negative biopsy by endoscopy.  The patient hemoglobin remains between 7 and 8.  He is fairly young and healthy but because of dropping hemoglobin and history of GI bleeding, he is referred to us to consider IV iron.  The patient is on high-dose PPI and unlikely to absorb a lot of iron with this dosing..   He is sent to us for a consultation, he has history of Asperger syndrome/autism spectrum disease.  His understanding of the disease is fairly good.  He denies any active bleeding at this point..      PAST MEDICAL HISTORY: has a past medical history of Acid reflux, Anemia, Anemia, Asperger syndrome, Asthma, Autism, and Upper GI bleed.    PAST SURGICAL HISTORY: has a past surgical history that includes Upper gastrointestinal endoscopy (N/A, 1/23/2024); Upper gastrointestinal endoscopy (N/A, 2/14/2024); Upper gastrointestinal endoscopy (N/A, 3/24/2025); and Upper gastrointestinal endoscopy (3/24/2025).     CURRENT MEDICATIONS:  has a current medication list which includes the

## 2025-04-28 NOTE — PATIENT INSTRUCTIONS
See orders for IV iron to be given As soon as they are approved. Please expedite as pt has surgery upcoming   Rv to see me in early June for pathology review

## 2025-05-06 ENCOUNTER — HOSPITAL ENCOUNTER (OUTPATIENT)
Dept: INFUSION THERAPY | Facility: MEDICAL CENTER | Age: 25
Discharge: HOME | End: 2025-05-06
Payer: COMMERCIAL

## 2025-05-06 VITALS
RESPIRATION RATE: 16 BRPM | HEART RATE: 86 BPM | DIASTOLIC BLOOD PRESSURE: 54 MMHG | TEMPERATURE: 98.4 F | SYSTOLIC BLOOD PRESSURE: 119 MMHG

## 2025-05-06 DIAGNOSIS — D62 ANEMIA DUE TO ACUTE BLOOD LOSS: ICD-10-CM

## 2025-05-06 DIAGNOSIS — D50.0 IRON DEFICIENCY ANEMIA DUE TO CHRONIC BLOOD LOSS: ICD-10-CM

## 2025-05-06 DIAGNOSIS — K90.9 IRON MALABSORPTION: Primary | ICD-10-CM

## 2025-05-06 PROCEDURE — 96376 TX/PRO/DX INJ SAME DRUG ADON: CPT

## 2025-05-06 PROCEDURE — 96366 THER/PROPH/DIAG IV INF ADDON: CPT

## 2025-05-06 PROCEDURE — 2580000003 HC RX 258: Performed by: INTERNAL MEDICINE

## 2025-05-06 PROCEDURE — 6360000002 HC RX W HCPCS: Performed by: INTERNAL MEDICINE

## 2025-05-06 PROCEDURE — 96365 THER/PROPH/DIAG IV INF INIT: CPT

## 2025-05-06 PROCEDURE — 6370000000 HC RX 637 (ALT 250 FOR IP): Performed by: INTERNAL MEDICINE

## 2025-05-06 PROCEDURE — 96375 TX/PRO/DX INJ NEW DRUG ADDON: CPT

## 2025-05-06 RX ORDER — SODIUM CHLORIDE 9 MG/ML
5-250 INJECTION, SOLUTION INTRAVENOUS PRN
OUTPATIENT
Start: 2025-05-06

## 2025-05-06 RX ORDER — HEPARIN 100 UNIT/ML
500 SYRINGE INTRAVENOUS PRN
OUTPATIENT
Start: 2025-05-06

## 2025-05-06 RX ORDER — DEXAMETHASONE SODIUM PHOSPHATE 10 MG/ML
10 INJECTION, SOLUTION INTRA-ARTICULAR; INTRALESIONAL; INTRAMUSCULAR; INTRAVENOUS; SOFT TISSUE ONCE
Status: COMPLETED | OUTPATIENT
Start: 2025-05-06 | End: 2025-05-06

## 2025-05-06 RX ORDER — DIPHENHYDRAMINE HYDROCHLORIDE 50 MG/ML
50 INJECTION, SOLUTION INTRAMUSCULAR; INTRAVENOUS
OUTPATIENT
Start: 2025-05-06

## 2025-05-06 RX ORDER — ONDANSETRON 2 MG/ML
8 INJECTION INTRAMUSCULAR; INTRAVENOUS
OUTPATIENT
Start: 2025-05-06

## 2025-05-06 RX ORDER — HYDROCORTISONE SODIUM SUCCINATE 100 MG/2ML
100 INJECTION INTRAMUSCULAR; INTRAVENOUS
OUTPATIENT
Start: 2025-05-06

## 2025-05-06 RX ORDER — SODIUM CHLORIDE 9 MG/ML
5-250 INJECTION, SOLUTION INTRAVENOUS PRN
Status: DISCONTINUED | OUTPATIENT
Start: 2025-05-06 | End: 2025-05-07 | Stop reason: HOSPADM

## 2025-05-06 RX ORDER — ACETAMINOPHEN 325 MG/1
650 TABLET ORAL
OUTPATIENT
Start: 2025-05-06

## 2025-05-06 RX ORDER — SODIUM CHLORIDE 9 MG/ML
INJECTION, SOLUTION INTRAVENOUS CONTINUOUS
OUTPATIENT
Start: 2025-05-06

## 2025-05-06 RX ORDER — SODIUM CHLORIDE 9 MG/ML
5-250 INJECTION, SOLUTION INTRAVENOUS PRN
Status: CANCELLED | OUTPATIENT
Start: 2025-05-06

## 2025-05-06 RX ORDER — EPINEPHRINE 1 MG/ML
0.3 INJECTION, SOLUTION INTRAMUSCULAR; SUBCUTANEOUS PRN
OUTPATIENT
Start: 2025-05-06

## 2025-05-06 RX ORDER — ACETAMINOPHEN 325 MG/1
650 TABLET ORAL ONCE
Status: COMPLETED | OUTPATIENT
Start: 2025-05-06 | End: 2025-05-06

## 2025-05-06 RX ORDER — FAMOTIDINE 10 MG/ML
20 INJECTION, SOLUTION INTRAVENOUS
OUTPATIENT
Start: 2025-05-06

## 2025-05-06 RX ORDER — ACETAMINOPHEN 325 MG/1
650 TABLET ORAL ONCE
Status: CANCELLED | OUTPATIENT
Start: 2025-05-06 | End: 2025-05-06

## 2025-05-06 RX ORDER — DEXAMETHASONE SODIUM PHOSPHATE 10 MG/ML
10 INJECTION, SOLUTION INTRA-ARTICULAR; INTRALESIONAL; INTRAMUSCULAR; INTRAVENOUS; SOFT TISSUE ONCE
Status: CANCELLED | OUTPATIENT
Start: 2025-05-06 | End: 2025-05-06

## 2025-05-06 RX ORDER — ALBUTEROL SULFATE 90 UG/1
4 INHALANT RESPIRATORY (INHALATION) PRN
OUTPATIENT
Start: 2025-05-06

## 2025-05-06 RX ORDER — SODIUM CHLORIDE 0.9 % (FLUSH) 0.9 %
5-40 SYRINGE (ML) INJECTION PRN
OUTPATIENT
Start: 2025-05-06

## 2025-05-06 RX ORDER — MEPERIDINE HYDROCHLORIDE 50 MG/ML
12.5 INJECTION INTRAMUSCULAR; INTRAVENOUS; SUBCUTANEOUS PRN
OUTPATIENT
Start: 2025-05-06

## 2025-05-06 RX ADMIN — SODIUM CHLORIDE 975 MG: 0.9 INJECTION, SOLUTION INTRAVENOUS at 13:09

## 2025-05-06 RX ADMIN — DEXAMETHASONE SODIUM PHOSPHATE 10 MG: 10 INJECTION INTRAMUSCULAR; INTRAVENOUS at 11:31

## 2025-05-06 RX ADMIN — SODIUM CHLORIDE 25 MG: 9 INJECTION, SOLUTION INTRAVENOUS at 11:54

## 2025-05-06 RX ADMIN — ACETAMINOPHEN 650 MG: 325 TABLET ORAL at 11:31

## 2025-05-06 RX ADMIN — SODIUM CHLORIDE 100 ML/HR: 0.9 INJECTION, SOLUTION INTRAVENOUS at 11:20

## 2025-05-06 NOTE — PROGRESS NOTES
Pt arrives per amb per self and states brought per transport and needs ride when done. States has not had infed prior and given lexicomp information and discussed possible side effects ie allergy and reviewed signs of allergy and to call if any tingling of lips or apprehension and any changes in breathing.  Pt tolerated premed and then test dose and then after about 1 hour infed tolerated well.  Pt states understanding and NS flushing line before and after and no reactions or complaints and blood return present throughout infusion and pt discharged per amb per self.

## 2025-05-21 ENCOUNTER — OFFICE VISIT (OUTPATIENT)
Dept: FAMILY MEDICINE CLINIC | Age: 25
End: 2025-05-21

## 2025-05-21 VITALS
HEART RATE: 94 BPM | OXYGEN SATURATION: 98 % | SYSTOLIC BLOOD PRESSURE: 124 MMHG | WEIGHT: 173 LBS | BODY MASS INDEX: 28.35 KG/M2 | DIASTOLIC BLOOD PRESSURE: 70 MMHG

## 2025-05-21 DIAGNOSIS — K31.89 DUODENAL MASS: ICD-10-CM

## 2025-05-21 DIAGNOSIS — Z09 HOSPITAL DISCHARGE FOLLOW-UP: Primary | ICD-10-CM

## 2025-05-21 RX ORDER — ONDANSETRON 4 MG/1
4 TABLET, ORALLY DISINTEGRATING ORAL EVERY 8 HOURS PRN
COMMUNITY
Start: 2025-05-13

## 2025-05-21 NOTE — PROGRESS NOTES
Post-Discharge Transitional Care Follow Up    Nicholas Martinez   YOB: 2000    Date of Office Visit:  5/21/2025  Date of Hospital Admission: 05/08/2025  Date of Hospital Discharge: 05/13/2025    Care management risk score Rising risk (score 2-5) and Complex Care (Scores >=6): No Risk Score On File     Non face to face  following discharge, date last encounter closed (first attempt may have been earlier): *No documented post hospital discharge outreach found in the last 14 days     Call initiated 2 business days of discharge: *No response recorded in the last 14 days    ASSESSMENT/PLAN:   Hospital discharge follow-up  -     [place] DISCHARGE MEDS RECONCILED W/ CURRENT OUTPATIENT MED LIST  Duodenal mass  Follow up with general surgery as scheduled      Medical Decision Making: moderate complexity  No follow-ups on file.           Subjective:   Presents for hospital follow     Admitted to  05/08/2025 - 05/13/2025 for open D3/D4 duodenal resection w/ duodenojejunostomy, cholecystectomy, and incidental appendectomy (5/8/25) for bleeding duodenal mass.  Has follow up with general surgeon 05/28/2025    Has [facility] coming out to the house twice a week for nursing care and PT for the next month   Surgical incision is clean, dry and in tact. Noticed some drainage coming from wound today after shower.   Was given the clear to start advancing diet from soft   Stool is not yet normal, it is runny and mushy  No abdominal pain other than sore incision  No vomiting, nausea   Managing pain with tylenol prn, seems to help           Inpatient course: Discharge summary reviewed- see chart.    Interval history/Current status: stable    Patient Active Problem List   Diagnosis    Upper GI bleed    Asperger syndrome    Anemia due to acute blood loss    Duodenal mass    Anemia    Chronic duodenal ulcer with bleeding    ELISABETH (iron deficiency anemia)    Iron malabsorption       Medication list at time of discharge reviewed:

## 2025-05-23 ASSESSMENT — ENCOUNTER SYMPTOMS
SINUS PAIN: 0
ABDOMINAL PAIN: 0
EYE PAIN: 0
VOMITING: 0
COLOR CHANGE: 0
NAUSEA: 0
BACK PAIN: 0
CONSTIPATION: 0
DIARRHEA: 0
CHEST TIGHTNESS: 0
SHORTNESS OF BREATH: 0
SINUS PRESSURE: 0

## 2025-06-09 ENCOUNTER — TELEPHONE (OUTPATIENT)
Age: 25
End: 2025-06-09

## 2025-06-09 ENCOUNTER — OFFICE VISIT (OUTPATIENT)
Age: 25
End: 2025-06-09
Payer: COMMERCIAL

## 2025-06-09 VITALS
WEIGHT: 173.7 LBS | DIASTOLIC BLOOD PRESSURE: 67 MMHG | SYSTOLIC BLOOD PRESSURE: 140 MMHG | TEMPERATURE: 98.2 F | HEART RATE: 70 BPM | BODY MASS INDEX: 28.47 KG/M2 | RESPIRATION RATE: 16 BRPM

## 2025-06-09 DIAGNOSIS — C49.A3 GIST (GASTROINTESTINAL STROMAL TUMOR) OF SMALL BOWEL, MALIGNANT (HCC): Primary | ICD-10-CM

## 2025-06-09 PROCEDURE — G8417 CALC BMI ABV UP PARAM F/U: HCPCS | Performed by: INTERNAL MEDICINE

## 2025-06-09 PROCEDURE — 99213 OFFICE O/P EST LOW 20 MIN: CPT | Performed by: INTERNAL MEDICINE

## 2025-06-09 PROCEDURE — G8427 DOCREV CUR MEDS BY ELIG CLIN: HCPCS | Performed by: INTERNAL MEDICINE

## 2025-06-09 PROCEDURE — 99214 OFFICE O/P EST MOD 30 MIN: CPT | Performed by: INTERNAL MEDICINE

## 2025-06-09 PROCEDURE — 1036F TOBACCO NON-USER: CPT | Performed by: INTERNAL MEDICINE

## 2025-06-09 NOTE — PROGRESS NOTES
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 male who is referred to us for management recommendation regarding his duodenal mass.  He presented with upper GI bleeding and EGD showed a duodenal mass that was measuring 2 cm.  However, subsequent testing showed that the mass was enlarging and it was up to 4 cm.  Pathology was done endoscopically and was negative for malignancy however because of the growing mass, there was a suspicion of malignancy despite negative histology.  GI decided that the mass is too big to be removed by endoscopic snare and I recommended referral to hepatobiliary surgeon.  Because of her neurofibromatosis the index of suspicion for malignancy is fairly high even with negative biopsy by endoscopy.  The patient hemoglobin remains between 7 and 8.  He is fairly young and healthy but because of dropping hemoglobin and history of GI bleeding, he is referred to us to consider IV iron.  The patient is on high-dose PPI and unlikely to absorb a lot of iron with this dosing..   He is sent to us for a consultation, he has history of Asperger syndrome/autism spectrum disease.  His understanding of the disease is fairly good.  He denies any active bleeding at this point..    The patient was sent to hepatobiliary and underwent surgical resection of adrenal mass.  Pathology showed GIST, 2.3 cm.  There is a low risk disease with chances of recurrence of about 8.3%.  No indication for adjuvant chemotherapy and the plan is surveillance.  INTERIM HISTORY  Patient comes in today for follow-up, he is recovering slowly from surgery.  He feels well and has no complaints.  Denies any

## 2025-06-09 NOTE — TELEPHONE ENCOUNTER
[facility] HERE FOR FOLLOW UP   Rv in 12 months with cbc, cmp [facility]   MD VISIT: 6/8/25 @ 2PM   LABS PRINTED AND GIVEN ON EXIT   AVS PRINTED AND GIVEN ON EXIT

## 2025-07-07 ENCOUNTER — HOSPITAL ENCOUNTER (OUTPATIENT)
Age: 25
Setting detail: SPECIMEN
Discharge: HOME | End: 2025-07-07

## 2025-07-07 ENCOUNTER — OFFICE VISIT (OUTPATIENT)
Dept: FAMILY MEDICINE CLINIC | Age: 25
End: 2025-07-07
Payer: COMMERCIAL

## 2025-07-07 VITALS
RESPIRATION RATE: 20 BRPM | SYSTOLIC BLOOD PRESSURE: 136 MMHG | OXYGEN SATURATION: 98 % | HEIGHT: 66 IN | DIASTOLIC BLOOD PRESSURE: 88 MMHG | HEART RATE: 74 BPM | WEIGHT: 182 LBS | BODY MASS INDEX: 29.25 KG/M2

## 2025-07-07 DIAGNOSIS — R74.8 ELEVATED LIVER ENZYMES: ICD-10-CM

## 2025-07-07 DIAGNOSIS — D64.9 ANEMIA, UNSPECIFIED TYPE: ICD-10-CM

## 2025-07-07 DIAGNOSIS — R74.8 ELEVATED LIVER ENZYMES: Primary | ICD-10-CM

## 2025-07-07 DIAGNOSIS — K92.2 GASTROINTESTINAL HEMORRHAGE, UNSPECIFIED GASTROINTESTINAL HEMORRHAGE TYPE: ICD-10-CM

## 2025-07-07 LAB
ALBUMIN SERPL-MCNC: 4.5 G/DL (ref 3.5–5.2)
ALBUMIN/GLOB SERPL: 1.9 {RATIO} (ref 1–2.5)
ALP SERPL-CCNC: 122 U/L (ref 40–129)
ALT SERPL-CCNC: 54 U/L (ref 10–50)
ANION GAP SERPL CALCULATED.3IONS-SCNC: 14 MMOL/L (ref 9–16)
AST SERPL-CCNC: 14 U/L (ref 10–50)
BASOPHILS # BLD: 0.07 K/UL (ref 0–0.2)
BASOPHILS NFR BLD: 1 % (ref 0–2)
BILIRUB SERPL-MCNC: 0.2 MG/DL (ref 0–1.2)
BUN SERPL-MCNC: 16 MG/DL (ref 6–20)
CALCIUM SERPL-MCNC: 9.5 MG/DL (ref 8.6–10.4)
CHLORIDE SERPL-SCNC: 108 MMOL/L (ref 98–107)
CO2 SERPL-SCNC: 23 MMOL/L (ref 20–31)
CREAT SERPL-MCNC: 0.9 MG/DL (ref 0.7–1.2)
EOSINOPHIL # BLD: 0.22 K/UL (ref 0–0.44)
EOSINOPHILS RELATIVE PERCENT: 4 % (ref 1–4)
ERYTHROCYTE [DISTWIDTH] IN BLOOD BY AUTOMATED COUNT: 16.4 % (ref 11.8–14.4)
FERRITIN SERPL-MCNC: 13 NG/ML (ref 30–400)
FOLATE SERPL-MCNC: 5.1 NG/ML (ref 4.8–24.2)
GFR, ESTIMATED: >90 ML/MIN/1.73M2
GLUCOSE SERPL-MCNC: 77 MG/DL (ref 74–99)
HCT VFR BLD AUTO: 41.5 % (ref 40.7–50.3)
HGB BLD-MCNC: 12.9 G/DL (ref 13–17)
IMM GRANULOCYTES # BLD AUTO: <0.03 K/UL (ref 0–0.3)
IMM GRANULOCYTES NFR BLD: 0 %
IRON SATN MFR SERPL: 9 % (ref 20–55)
IRON SERPL-MCNC: 31 UG/DL (ref 61–157)
LYMPHOCYTES NFR BLD: 1.31 K/UL (ref 1.1–3.7)
LYMPHOCYTES RELATIVE PERCENT: 22 % (ref 24–43)
MCH RBC QN AUTO: 24.5 PG (ref 25.2–33.5)
MCHC RBC AUTO-ENTMCNC: 31.1 G/DL (ref 28.4–34.8)
MCV RBC AUTO: 78.9 FL (ref 82.6–102.9)
MONOCYTES NFR BLD: 0.39 K/UL (ref 0.1–1.2)
MONOCYTES NFR BLD: 6 % (ref 3–12)
NEUTROPHILS NFR BLD: 67 % (ref 36–65)
NEUTS SEG NFR BLD: 4.05 K/UL (ref 1.5–8.1)
NRBC BLD-RTO: 0 PER 100 WBC
PLATELET # BLD AUTO: ABNORMAL K/UL (ref 138–453)
PLATELET, FLUORESCENCE: 220 K/UL (ref 138–453)
PLATELETS.RETICULATED NFR BLD AUTO: 3.9 % (ref 1.1–10.3)
POTASSIUM SERPL-SCNC: 3.9 MMOL/L (ref 3.7–5.3)
PROT SERPL-MCNC: 6.9 G/DL (ref 6.6–8.7)
RBC # BLD AUTO: 5.26 M/UL (ref 4.21–5.77)
RBC # BLD: ABNORMAL 10*6/UL
SODIUM SERPL-SCNC: 145 MMOL/L (ref 136–145)
TIBC SERPL-MCNC: 352 UG/DL (ref 250–450)
UNSATURATED IRON BINDING CAPACITY: 321 UG/DL (ref 112–347)
VIT B12 SERPL-MCNC: 542 PG/ML (ref 232–1245)
WBC OTHER # BLD: 6.1 K/UL (ref 3.5–11.3)

## 2025-07-07 PROCEDURE — 99213 OFFICE O/P EST LOW 20 MIN: CPT | Performed by: NURSE PRACTITIONER

## 2025-07-07 PROCEDURE — G8427 DOCREV CUR MEDS BY ELIG CLIN: HCPCS | Performed by: NURSE PRACTITIONER

## 2025-07-07 PROCEDURE — 1036F TOBACCO NON-USER: CPT | Performed by: NURSE PRACTITIONER

## 2025-07-07 PROCEDURE — G8417 CALC BMI ABV UP PARAM F/U: HCPCS | Performed by: NURSE PRACTITIONER

## 2025-07-07 ASSESSMENT — ENCOUNTER SYMPTOMS
SINUS PRESSURE: 0
BACK PAIN: 0
SHORTNESS OF BREATH: 0
CHEST TIGHTNESS: 0
NAUSEA: 0
EYE PAIN: 0
SINUS PAIN: 0
DIARRHEA: 0
VOMITING: 0
COLOR CHANGE: 0
ABDOMINAL PAIN: 0
CONSTIPATION: 0

## 2025-07-07 NOTE — ASSESSMENT & PLAN NOTE
Recheck CBC today, follow up pending results  Advised to find new oncologist and PCP if he does re locate prior to next OV     Orders:    CBC with Auto Differential; Future

## 2025-07-07 NOTE — PROGRESS NOTES
140/67   05/21/25 124/70     Pulse Readings from Last 3 Encounters:   07/07/25 74   06/09/25 70   05/21/25 94           An electronic signature was used to authenticate this note.    --XANDER Moreno - NP

## 2025-07-08 ENCOUNTER — RESULTS FOLLOW-UP (OUTPATIENT)
Dept: FAMILY MEDICINE CLINIC | Age: 25
End: 2025-07-08

## 2025-07-08 DIAGNOSIS — D50.9 IRON DEFICIENCY ANEMIA, UNSPECIFIED IRON DEFICIENCY ANEMIA TYPE: Primary | ICD-10-CM

## 2025-07-08 RX ORDER — FERROUS SULFATE 325(65) MG
325 TABLET ORAL
Qty: 90 TABLET | Refills: 1 | Status: SHIPPED | OUTPATIENT
Start: 2025-07-08

## (undated) DEVICE — FORCEPS BX L240CM WRK CHN 2.8MM STD CAP W/ NDL MIC MESH

## (undated) DEVICE — CATHETER SCLERO L240CM NDL 25GA L4MM SHTH DIA2.3MM CNTRST

## (undated) DEVICE — Device: Brand: SPOT EX ENDOSCOPIC TATTOO

## (undated) DEVICE — FORCEPS BX 240CM 2.4MM L NDL RAD JAW 4 M00513334

## (undated) DEVICE — BITE BLOCK ENDOSCP AD 60 FR W/ ADJ STRP PLAS GRN BLOX

## (undated) DEVICE — STAZ ENDO KIT: Brand: MEDLINE INDUSTRIES, INC.

## (undated) DEVICE — BIPOLAR ELECTROHEMOSTASIS CATHETER: Brand: GOLD PROBE

## (undated) DEVICE — BITEBLOCK ENDOSCP 60FR MAXI WHT POLYETH STURDY W/ VELC WVN